# Patient Record
Sex: FEMALE | Race: WHITE | Employment: OTHER | URBAN - METROPOLITAN AREA
[De-identification: names, ages, dates, MRNs, and addresses within clinical notes are randomized per-mention and may not be internally consistent; named-entity substitution may affect disease eponyms.]

---

## 2019-06-18 ENCOUNTER — NURSING HOME VISIT (OUTPATIENT)
Dept: GERIATRICS | Facility: OTHER | Age: 84
End: 2019-06-18
Payer: MEDICARE

## 2019-06-18 DIAGNOSIS — F02.80 LATE ONSET ALZHEIMER'S DISEASE WITHOUT BEHAVIORAL DISTURBANCE (HCC): ICD-10-CM

## 2019-06-18 DIAGNOSIS — R53.81 DEBILITY: Primary | ICD-10-CM

## 2019-06-18 DIAGNOSIS — R63.4 WEIGHT LOSS: ICD-10-CM

## 2019-06-18 DIAGNOSIS — G30.1 LATE ONSET ALZHEIMER'S DISEASE WITHOUT BEHAVIORAL DISTURBANCE (HCC): ICD-10-CM

## 2019-06-18 PROCEDURE — 99307 SBSQ NF CARE SF MDM 10: CPT | Performed by: FAMILY MEDICINE

## 2019-08-19 ENCOUNTER — NURSING HOME VISIT (OUTPATIENT)
Dept: GERIATRICS | Facility: OTHER | Age: 84
End: 2019-08-19
Payer: MEDICARE

## 2019-08-19 DIAGNOSIS — R53.81 DEBILITY: ICD-10-CM

## 2019-08-19 DIAGNOSIS — G30.1 LATE ONSET ALZHEIMER'S DISEASE WITHOUT BEHAVIORAL DISTURBANCE (HCC): Primary | ICD-10-CM

## 2019-08-19 DIAGNOSIS — K05.6 PERIODONTAL DISEASE: ICD-10-CM

## 2019-08-19 DIAGNOSIS — E03.9 HYPOTHYROIDISM, UNSPECIFIED TYPE: ICD-10-CM

## 2019-08-19 DIAGNOSIS — F02.80 LATE ONSET ALZHEIMER'S DISEASE WITHOUT BEHAVIORAL DISTURBANCE (HCC): Primary | ICD-10-CM

## 2019-08-19 DIAGNOSIS — I82.509 CHRONIC DEEP VEIN THROMBOSIS (DVT) (HCC): ICD-10-CM

## 2019-08-19 PROCEDURE — 99309 SBSQ NF CARE MODERATE MDM 30: CPT | Performed by: FAMILY MEDICINE

## 2019-08-19 NOTE — ASSESSMENT & PLAN NOTE
Advanced, with progressive, expected decline  Continue supportive care  Continue LTCF for 24/7 and ADL care  Patient remains appropriate for hospice care

## 2019-08-19 NOTE — PROGRESS NOTES
[facility]   [address] [facility], [place], [address]  Progress Note  POS: Nursing Facility/LTC- 32  Hospice patient    Unable to obtain from patient due to:  unable to obtain from patient: Dementia  Chief Complaint/Reason for visit: Follow up of chronic medical conditions   History of Present Illness: 80year old female seen for follow up of chronic medical conditions  She continues on hospice care  Has dementia, minimal verbal responses to questioning, word salad response to open conversation; answers "no" when questioned directly about if she has any pain  Her weight is down 2lbs since June  Last bowel movement on 8/17 per nursing note review  Past Medical History: unchanged from history and physical  Past Medical History:   Diagnosis Date    Alzheimer's dementia     Anemia     Aphasia     Chronic diastolic CHF (congestive heart failure) ([facility])     Depression     DVT (deep venous thrombosis) (HCC)     Hyperlipidemia     Hypothyroidism     OA (osteoarthritis)     Osteoporosis      Family History: unchanged from history and physical  Social History: unchanged from history and physical  Resident Since: August 2013  Review of systems: Review of Systems   Unable to perform ROS: Dementia     Medications: No changes made  Allergies: NKDA  Consults reviewed:N/A  Labs/Diagnostics (reviewed by this provider): N/A    Physical Exam    Weight: 159 2lb Temp:97 5F BP:125/76 Pulse:82 Resp:18 O2 Sat:N/A  Constitutional: Well-nourished, Normocephalic and Pallor  Orientation:Person- opens eyes to name     Physical Exam   Constitutional: No distress  HENT:   Head: Normocephalic and atraumatic  Mouth/Throat: Oropharynx is clear and moist    Eyes: Conjunctivae are normal  Right eye exhibits no discharge  Left eye exhibits no discharge  No scleral icterus  Cardiovascular: Normal rate and regular rhythm  Pulmonary/Chest: Effort normal  No respiratory distress  Abdominal: Soft  She exhibits distension   There is no tenderness  There is no guarding  Hyperactive bowel sounds   Neurological: She is alert  Minimal verbal responses; word salad responses to open conversation and questioning  Moves all 4 extremities    Skin: She is not diaphoretic  There is pallor  Psychiatric: Cognition and memory are impaired  Flat affect   Nursing note and vitals reviewed      Assessment/Plan:  80year old female with:    Late onset Alzheimer's disease without behavioral disturbance  Advanced, with progressive, expected decline  Continue supportive care  Continue LTCF for 24/7 and ADL care  Patient remains appropriate for hospice care    Hypothyroidism  Continue levothyroxine 50mcg daily    Chronic deep vein thrombosis (DVT) ([facility] [address] )  Continues on xarelto     Periodontal disease  Continue oral care; continue chlorhexidine, peridex  Completed course of Keflex in March    Debility  Multifactorial  Continue LTCF for 24/7 and ADL care  Supportive care      Rene rodney DO  8/19/201911:08 AM

## 2019-10-24 ENCOUNTER — NURSING HOME VISIT (OUTPATIENT)
Dept: GERIATRICS | Facility: OTHER | Age: 84
End: 2019-10-24
Payer: MEDICARE

## 2019-10-24 DIAGNOSIS — E03.9 HYPOTHYROIDISM, UNSPECIFIED TYPE: ICD-10-CM

## 2019-10-24 DIAGNOSIS — G30.1 LATE ONSET ALZHEIMER'S DISEASE WITHOUT BEHAVIORAL DISTURBANCE (HCC): Primary | ICD-10-CM

## 2019-10-24 DIAGNOSIS — F02.80 LATE ONSET ALZHEIMER'S DISEASE WITHOUT BEHAVIORAL DISTURBANCE (HCC): Primary | ICD-10-CM

## 2019-10-24 DIAGNOSIS — R62.7 FAILURE TO THRIVE IN ADULT: ICD-10-CM

## 2019-10-24 PROCEDURE — 99308 SBSQ NF CARE LOW MDM 20: CPT | Performed by: FAMILY MEDICINE

## 2019-10-24 NOTE — PROGRESS NOTES
[address], [address]  Progress Note  POS: Nursing Facility/LTC- 32  Hospice Patient    Unable to obtain from patient due to:  unable to obtain from patient: Dementia Additional history obtained from daughter at bedside  Chief Complaint/Reason for visit: Follow up of chronic medical conditions  History of Present Illness: 80year old female seen for follow up of chronic medical conditions  Daughter at bedside and provides additional history  She notes patient has improved appetite and PO intake since she was treated with antibiotics for dental pain/broken teeth; daughter feels patient is more comfortable and hasn't seemed in pain to her  Past Medical History: unchanged from history and physical  Past Medical History:   Diagnosis Date    Alzheimer's dementia     Anemia     Aphasia     Chronic diastolic CHF (congestive heart failure) (HCC)     Depression     DVT (deep venous thrombosis) (HCC)     Hyperlipidemia     Hypothyroidism     OA (osteoarthritis)     Osteoporosis      Family History: unchanged from history and physical  Social History: unchanged from history and physical  Resident Since: August 2013  Review of systems: Review of Systems   Unable to perform ROS: Dementia   Constitutional: Negative for appetite change, fatigue and unexpected weight change  Psychiatric/Behavioral: Negative for behavioral problems  Medications: No changes made  Allergies: NKDA  Consults reviewed: N/A  Labs/Diagnostics (reviewed by this provider): N/A    Physical Exam    Weight: 159 2lb Temp:98 3F BP:112/67 Pulse:88 Resp:16   Constitutional: Well-nourished and Normocephalic  Orientation:Person     Physical Exam   Constitutional: She appears well-developed and well-nourished  No distress  HENT:   Head: Normocephalic and atraumatic     Right Ear: External ear normal    Left Ear: External ear normal    Mouth/Throat: Oropharynx is clear and moist    Eyes: Conjunctivae are normal  Right eye exhibits no discharge  Left eye exhibits no discharge  No scleral icterus  Neck: Neck supple  Cardiovascular: Normal rate and regular rhythm  Pulmonary/Chest: Effort normal and breath sounds normal  No respiratory distress  Abdominal: Soft  Bowel sounds are normal  She exhibits no distension  Neurological: She is alert  Skin: Skin is warm and dry  She is not diaphoretic  Psychiatric: She is withdrawn  Cognition and memory are impaired  She is noncommunicative  Nursing note and vitals reviewed      Assessment/Plan:  80year old female with:    Late onset Alzheimer's disease without behavioral disturbance  Continue supportive care  Continue LTCF for 24/7 and ADL care    Failure to thrive in adult  In setting of above  Compounded by periodontal disease  Per daughter, some improvement in PO intake since treated for tooth disease  Plan to monitor weights at least monthly  Supportive care, nutritional support    Hypothyroidism  Continue levothyroxine 50mcg daily      Carolann Madera,   10/24/19

## 2019-10-27 PROBLEM — R62.7 FAILURE TO THRIVE IN ADULT: Status: ACTIVE | Noted: 2019-10-27

## 2019-10-27 NOTE — ASSESSMENT & PLAN NOTE
In setting of above  Compounded by periodontal disease  Per daughter, some improvement in PO intake since treated for tooth disease  Plan to monitor weights at least monthly  Supportive care, nutritional support

## 2019-12-19 ENCOUNTER — NURSING HOME VISIT (OUTPATIENT)
Dept: GERIATRICS | Facility: OTHER | Age: 84
End: 2019-12-19
Payer: MEDICARE

## 2019-12-19 DIAGNOSIS — F02.80 LATE ONSET ALZHEIMER'S DISEASE WITHOUT BEHAVIORAL DISTURBANCE (HCC): Primary | ICD-10-CM

## 2019-12-19 DIAGNOSIS — I82.501 CHRONIC DEEP VEIN THROMBOSIS (DVT) OF RIGHT LOWER EXTREMITY, UNSPECIFIED VEIN (HCC): ICD-10-CM

## 2019-12-19 DIAGNOSIS — E03.9 HYPOTHYROIDISM, UNSPECIFIED TYPE: ICD-10-CM

## 2019-12-19 DIAGNOSIS — R13.10 DYSPHAGIA, UNSPECIFIED TYPE: ICD-10-CM

## 2019-12-19 DIAGNOSIS — Z51.5 HOSPICE CARE PATIENT: ICD-10-CM

## 2019-12-19 DIAGNOSIS — G30.1 LATE ONSET ALZHEIMER'S DISEASE WITHOUT BEHAVIORAL DISTURBANCE (HCC): Primary | ICD-10-CM

## 2019-12-19 DIAGNOSIS — R53.81 DEBILITY: ICD-10-CM

## 2019-12-19 PROCEDURE — 99308 SBSQ NF CARE LOW MDM 20: CPT | Performed by: FAMILY MEDICINE

## 2019-12-22 PROBLEM — Z51.5 HOSPICE CARE PATIENT: Status: ACTIVE | Noted: 2019-12-22

## 2019-12-22 PROBLEM — R13.10 DYSPHAGIA: Status: ACTIVE | Noted: 2019-12-22

## 2020-02-10 ENCOUNTER — NURSING HOME VISIT (OUTPATIENT)
Dept: GERIATRICS | Facility: OTHER | Age: 85
End: 2020-02-10
Payer: MEDICARE

## 2020-02-10 DIAGNOSIS — I82.501 CHRONIC DEEP VEIN THROMBOSIS (DVT) OF RIGHT LOWER EXTREMITY, UNSPECIFIED VEIN (HCC): ICD-10-CM

## 2020-02-10 DIAGNOSIS — R53.81 DEBILITY: ICD-10-CM

## 2020-02-10 DIAGNOSIS — E03.9 HYPOTHYROIDISM, UNSPECIFIED TYPE: Primary | ICD-10-CM

## 2020-02-10 DIAGNOSIS — G30.1 LATE ONSET ALZHEIMER'S DISEASE WITHOUT BEHAVIORAL DISTURBANCE (HCC): ICD-10-CM

## 2020-02-10 DIAGNOSIS — F02.80 LATE ONSET ALZHEIMER'S DISEASE WITHOUT BEHAVIORAL DISTURBANCE (HCC): ICD-10-CM

## 2020-02-10 PROCEDURE — 99309 SBSQ NF CARE MODERATE MDM 30: CPT | Performed by: FAMILY MEDICINE

## 2020-02-10 NOTE — PROGRESS NOTES
[facility]   [address] [facility], [place], [address]  Progress Note  POS: Nursing Facility/LTC- 28    Unable to obtain from patient due to:  unable to obtain from patient: Dementia  Chief Complaint/Reason for visit: Follow up dementia, hypothyroidism, chronic DVT  History of Present Illness: 80year old female seen for routine follow up of chronic medical conditions  Patient with improved appetite and PO intake with pureed diets and feeding assistance; weight 157 2lb January 2019, 165 4lb 1/29/20; patient discharged from hospice care in this setting  Continued requirement of LTC/ADL care due to advanced dementia  Due for TSH and metabolic panel for hypothyroidism, continues on levothyroxine       Past Medical History: unchanged from history and physical  Past Medical History:   Diagnosis Date    Alzheimer's dementia ([facility] [address] )     Anemia     Aphasia     Chronic diastolic CHF (congestive heart failure) ([facility])     Depression     DVT (deep venous thrombosis) ([facility])     Hyperlipidemia     Hypothyroidism     OA (osteoarthritis)     Osteoporosis      Family History: unchanged from history and physical  Social History: unchanged from history and physical  Resident Since: August 2013  Review of systems: Review of Systems   Unable to perform ROS: Dementia     Medications: No changes made and Pharmacy query addressed   Cyclobenzaprine 5mg BID  Levothyroxine 50mcg daily  Chlorhexidine mouth wash daily  Senna-S 8 6mh-50mg, 2 tablets daily  Xarelto 15 mg daily    PRN: dulcolax, fleet enema, MoM, maalox, acetaminophen, adult tussin, miralax, anbesol    Puree dysphagia 1 diet    Allergies: NKDA  Consults reviewed:N/A  Labs/Diagnostics (reviewed by this provider): Copy in Chart- March 2019    Physical Exam    Weight: 165 4lb Temp:97 6F BP:122/65  Pulse:18 Resp:83  Constitutional: Well-nourished and Normocephalic  Orientation:Person- opens eyes to name     Physical Exam   Constitutional: She appears well-developed and well-nourished  No distress  HENT:   Head: Normocephalic and atraumatic  Mouth/Throat: Oropharynx is clear and moist    Missing/poor dentition   Eyes: Right eye exhibits no discharge  Left eye exhibits no discharge  Neck: No tracheal deviation present  Pulmonary/Chest: Effort normal  No respiratory distress  Musculoskeletal: She exhibits no edema or tenderness  Neurological: No cranial nerve deficit  Opens eyes briefly to name   Skin: Skin is warm and dry  She is not diaphoretic  There is pallor  Psychiatric: She is withdrawn  Cognition and memory are impaired  Nursing note and vitals reviewed      Assessment/Plan:  80year old female with:    Hypothyroidism  Continue levothyroxine 50mcg daily  TSH, free T4, BMP ordered for 3/5/20    Chronic deep vein thrombosis (DVT) (HCC)  Chronic RLE DVT  Continue xarelto  CBC w/diff, BMP ordered for 3/5/20    Late onset Alzheimer's disease without behavioral disturbance  Continue supportive care  Continue LTCF for 24/7 and ADL care  Management of chronic medical conditions as outlined     Debility  Multifactorial  Continue LTCF for 24/7 and ADL care  Supportive care      Felisa Anthony DO  2/10/20

## 2020-02-11 NOTE — ASSESSMENT & PLAN NOTE
Continue supportive care  Continue LTCF for 24/7 and ADL care  Management of chronic medical conditions as outlined

## 2020-03-23 ENCOUNTER — NURSING HOME VISIT (OUTPATIENT)
Dept: GERIATRICS | Facility: OTHER | Age: 85
End: 2020-03-23
Payer: MEDICARE

## 2020-03-23 DIAGNOSIS — R13.10 DYSPHAGIA, UNSPECIFIED TYPE: ICD-10-CM

## 2020-03-23 DIAGNOSIS — E03.9 HYPOTHYROIDISM, UNSPECIFIED TYPE: ICD-10-CM

## 2020-03-23 DIAGNOSIS — R53.81 DEBILITY: ICD-10-CM

## 2020-03-23 DIAGNOSIS — G30.1 LATE ONSET ALZHEIMER'S DISEASE WITHOUT BEHAVIORAL DISTURBANCE (HCC): Primary | ICD-10-CM

## 2020-03-23 DIAGNOSIS — F02.80 LATE ONSET ALZHEIMER'S DISEASE WITHOUT BEHAVIORAL DISTURBANCE (HCC): Primary | ICD-10-CM

## 2020-03-23 DIAGNOSIS — R63.4 WEIGHT LOSS: ICD-10-CM

## 2020-03-23 PROCEDURE — 99309 SBSQ NF CARE MODERATE MDM 30: CPT | Performed by: FAMILY MEDICINE

## 2020-03-23 NOTE — PROGRESS NOTES
[facility]   [address] [facility], [place], [address]  Progress Note  POS: Nursing Facility/LTC- 28    Unable to obtain from patient due to:  unable to obtain from patient: Dementia  Chief Complaint/Reason for visit:Follow up chronic medical conditions; weight loss, dementia, hypothyroidism  History of Present Illness: 80year old female seen for follow up of chronic medical conditions  Noted to have recent 10lb weight loss  Continues on levothyroxine for hypothyroidism- labs checked and WNL  Has poor dentition and advanced dementia with dysphagia, requires feeding assistance and total ADL care  No reports of pain or discomfort; prior authorization sent earlier this month for flexeril  Past Medical History: unchanged from history and physical  Past Medical History:   Diagnosis Date    Alzheimer's dementia ([facility] [address] )     Anemia     Aphasia     Chronic diastolic CHF (congestive heart failure) (HCC)     Depression     DVT (deep venous thrombosis) (HCC)     Hyperlipidemia     Hypothyroidism     OA (osteoarthritis)     Osteoporosis      Family History: unchanged from history and physical  Social History: unchanged from history and physical  Resident Since: August 2013  Review of systems: Review of Systems   Unable to perform ROS: Dementia   Constitutional: Positive for unexpected weight change  Negative for fever  Medications: No changes made  Allergies: NKDA  Consults reviewed:N/A  Labs/Diagnostics (reviewed by this provider): Copy in Chart (3/5/20)  TSH:3 11     Free T4:1 44  CBC: Hb:13 6 Hct:40 9 WBC:7 7 PLT:208  CMP: Na:140 K:4 0 Cl:106 CO:27 BUN:12 Cr:0 57 Glu:74 Ca:8 8   AST:22 ALT:39 Alk-P:63 Tprotein:6 5 Albumin:2 9   Tbili:0 5     Physical Exam    Weight: 155lb (165 4) Temp:98F BP:112/71  Pulse:80 Resp:20 O2 Sat:97% RA  Constitutional: Well-nourished and Normocephalic  Orientation:Not able to assess secondary to dementia/aphasia      Physical Exam   Constitutional: She appears lethargic  No distress  HENT:   Head: Normocephalic and atraumatic  Mouth/Throat: Oropharynx is clear and moist  No oropharyngeal exudate  Poor/missing dentition    Eyes: Right eye exhibits no discharge  Left eye exhibits no discharge  Neck: Neck supple  No tracheal deviation present  Cardiovascular: Normal rate and regular rhythm  Pulmonary/Chest: Effort normal  No respiratory distress  Abdominal: Soft  Bowel sounds are normal    Musculoskeletal: She exhibits no edema or tenderness  Neurological: She appears lethargic  Opens eyes briefly to voice then goes back to sleep    Skin: Skin is warm and dry  She is not diaphoretic  Psychiatric: She is withdrawn  Cognition and memory are impaired  She is noncommunicative  Nursing note and vitals reviewed      Assessment/Plan:  80year old female with:    Late onset Alzheimer's disease without behavioral disturbance  Continue supportive care  Continue LTCF for 24/7 and ADL care  Management of chronic medical conditions as outlined   With continued progression with associated weight loss    Weight loss  In setting of above  Continue supportive care, nutritional support  Dietary/nutrition consult and supplementation, patient requires total assist for feed    Hypothyroidism  Continue levothyroxine 50mcg daily  TSH, free T4 recently checked and WNL    Dysphagia  Continue puree diet  Aspiration precautions  Monitor weights monthly    Debility  Multifactorial  Continue LTCF for 24/7 and ADL care  Supportive care      [facility],   3/23/20

## 2020-03-23 NOTE — ASSESSMENT & PLAN NOTE
In setting of above  Continue supportive care, nutritional support  Dietary/nutrition consult and supplementation, patient requires total assist for feed

## 2020-03-23 NOTE — ASSESSMENT & PLAN NOTE
Continue supportive care  Continue LTCF for 24/7 and ADL care  Management of chronic medical conditions as outlined   With continued progression with associated weight loss

## 2020-05-08 ENCOUNTER — NURSING HOME VISIT (OUTPATIENT)
Dept: GERIATRICS | Facility: OTHER | Age: 85
End: 2020-05-08
Payer: MEDICARE

## 2020-05-08 DIAGNOSIS — M62.838 MUSCLE SPASM: ICD-10-CM

## 2020-05-08 DIAGNOSIS — G30.1 LATE ONSET ALZHEIMER'S DISEASE WITHOUT BEHAVIORAL DISTURBANCE (HCC): Primary | ICD-10-CM

## 2020-05-08 DIAGNOSIS — F02.80 LATE ONSET ALZHEIMER'S DISEASE WITHOUT BEHAVIORAL DISTURBANCE (HCC): Primary | ICD-10-CM

## 2020-05-08 DIAGNOSIS — E03.9 HYPOTHYROIDISM, UNSPECIFIED TYPE: ICD-10-CM

## 2020-05-08 DIAGNOSIS — I82.501 CHRONIC DEEP VEIN THROMBOSIS (DVT) OF RIGHT LOWER EXTREMITY, UNSPECIFIED VEIN (HCC): ICD-10-CM

## 2020-05-08 DIAGNOSIS — R53.81 DEBILITY: ICD-10-CM

## 2020-05-08 PROCEDURE — 99309 SBSQ NF CARE MODERATE MDM 30: CPT | Performed by: FAMILY MEDICINE

## 2020-05-09 PROBLEM — Z51.5 HOSPICE CARE PATIENT: Status: RESOLVED | Noted: 2019-12-22 | Resolved: 2020-05-09

## 2020-05-09 PROBLEM — M62.838 MUSCLE SPASM: Status: ACTIVE | Noted: 2020-05-09

## 2020-06-01 LAB — EXT SARS-COV-2: NOT DETECTED

## 2020-07-09 ENCOUNTER — NURSING HOME VISIT (OUTPATIENT)
Dept: GERIATRICS | Facility: OTHER | Age: 85
End: 2020-07-09
Payer: MEDICARE

## 2020-07-09 DIAGNOSIS — I82.501 CHRONIC DEEP VEIN THROMBOSIS (DVT) OF RIGHT LOWER EXTREMITY, UNSPECIFIED VEIN (HCC): Primary | ICD-10-CM

## 2020-07-09 DIAGNOSIS — R53.81 DEBILITY: ICD-10-CM

## 2020-07-09 DIAGNOSIS — G30.1 LATE ONSET ALZHEIMER'S DISEASE WITHOUT BEHAVIORAL DISTURBANCE (HCC): ICD-10-CM

## 2020-07-09 DIAGNOSIS — R13.10 DYSPHAGIA, UNSPECIFIED TYPE: ICD-10-CM

## 2020-07-09 DIAGNOSIS — E03.9 HYPOTHYROIDISM, UNSPECIFIED TYPE: ICD-10-CM

## 2020-07-09 DIAGNOSIS — M62.838 MUSCLE SPASM: ICD-10-CM

## 2020-07-09 DIAGNOSIS — F02.80 LATE ONSET ALZHEIMER'S DISEASE WITHOUT BEHAVIORAL DISTURBANCE (HCC): ICD-10-CM

## 2020-07-09 PROCEDURE — 99308 SBSQ NF CARE LOW MDM 20: CPT | Performed by: FAMILY MEDICINE

## 2020-07-09 NOTE — PROGRESS NOTES
[address], [facility], [address]  Progress Note  POS: Nursing Facility/LTC-32    Unable to obtain from patient due to:  unable to obtain from patient: Dementia  Chief Complaint/Reason for visit:  History of Present Illness: 80year old female evaluated for routine follow up of chronic medical conditions  Continues on levothyroxine for hypothyroidism; weight stable overall  Tolerating baclofen for upper extremity mild contractures  Continues on xarelto for chronic DVT  Past Medical History: unchanged from history and physical  Past Medical History:   Diagnosis Date    Alzheimer's dementia ([address] )     Anemia     Aphasia     [facility]cong[facility]) ([facility])     Depression     DVT (deep venous thrombosis) (HCC)     Hyperlipidemia     Hypothyroidism     OA (osteoarthritis)     Osteoporosis      Family History: unchanged from history and physical  Social History: unchanged from history and physical  Resident Since: August 2013  Review of systems: Review of Systems   Unable to perform ROS: Dementia     Medications: No changes made   Baclofen 10mg BID  Levothyroxine 50mcg daily  Senna-S 8 6mg-50mg daily  Xarelto 15mg daily    PRN: dulcolax, fleet enema, maalox, MoM, miralax, anbesol, acetaminophen    Allergies: NKDA  Consults reviewed: N/A  Labs/Diagnostics (reviewed by this provider): Copy in Chart   Other: COVID-19 (7/1/20): negative    Physical Exam    Weight: 156 3lb (157 6) Temp:97 2F BP:132/54  Pulse:96 Resp:18 O2 Sat:N/A  Constitutional: Well-nourished and Normocephalic  Orientation:Person     Physical Exam   Constitutional: She appears well-developed and well-nourished  No distress  HENT:   Head: Normocephalic and atraumatic  Mouth/Throat: Oropharynx is clear and moist    Eyes: Conjunctivae are normal  Right eye exhibits no discharge  Left eye exhibits no discharge  No scleral icterus  Neck: Neck supple     Decreased ROM overall   Cardiovascular: Normal rate and regular rhythm  Pulmonary/Chest: Effort normal  No respiratory distress  She has no wheezes  Abdominal: Soft  She exhibits no distension  Musculoskeletal: She exhibits deformity (mild flexion contractures b/l hands)  She exhibits no edema  Neurological: She is alert  She exhibits abnormal muscle tone (increased tone b/l UEs)  Skin: Skin is warm and dry  She is not diaphoretic  Psychiatric: She is slowed  Cognition and memory are impaired  She is noncommunicative (minimal verbal sounds)  She is inattentive  Nursing note and vitals reviewed      Assessment/Plan:  80year old female with:    Chronic deep vein thrombosis (DVT) (HCC)  Chronic RLE DVT  Continue xarelto    Hypothyroidism  Continue levothyroxine    Dysphagia  Continue puree diet  Aspiration precautions  Monitor weights monthly- stable overall    Late onset Alzheimer's disease without behavioral disturbance  Continue supportive care  Continue LTCF for 24/7 and ADL care  Management of chronic medical conditions as outlined     Muscle spasm  In setting of advanced dementia; with mild flexion contractures of b/l wrists/hands  Continue baclofen, repositioning; monitor skin closely    Debility  Multifactorial  Continue LTCF for 24/7 and ADL care  Supportive care    CBC, CMP, TFTs WNL March 2020- plan to recheck March 2021 or change in condition    Belinda Madera DO  7/9/20

## 2020-07-14 PROBLEM — R63.4 WEIGHT LOSS: Status: RESOLVED | Noted: 2019-06-18 | Resolved: 2020-07-14

## 2020-07-14 PROBLEM — R62.7 FAILURE TO THRIVE IN ADULT: Status: RESOLVED | Noted: 2019-10-27 | Resolved: 2020-07-14

## 2020-07-14 NOTE — ASSESSMENT & PLAN NOTE
In setting of advanced dementia; with mild flexion contractures of b/l wrists/hands  Continue baclofen, repositioning; monitor skin closely

## 2020-09-04 ENCOUNTER — NURSING HOME VISIT (OUTPATIENT)
Dept: GERIATRICS | Facility: OTHER | Age: 85
End: 2020-09-04
Payer: MEDICARE

## 2020-09-04 DIAGNOSIS — M62.838 MUSCLE SPASM: ICD-10-CM

## 2020-09-04 DIAGNOSIS — F02.80 LATE ONSET ALZHEIMER'S DISEASE WITHOUT BEHAVIORAL DISTURBANCE (HCC): Primary | ICD-10-CM

## 2020-09-04 DIAGNOSIS — E03.9 HYPOTHYROIDISM, UNSPECIFIED TYPE: ICD-10-CM

## 2020-09-04 DIAGNOSIS — I82.501 CHRONIC DEEP VEIN THROMBOSIS (DVT) OF RIGHT LOWER EXTREMITY, UNSPECIFIED VEIN (HCC): ICD-10-CM

## 2020-09-04 DIAGNOSIS — R13.10 DYSPHAGIA, UNSPECIFIED TYPE: ICD-10-CM

## 2020-09-04 DIAGNOSIS — G30.1 LATE ONSET ALZHEIMER'S DISEASE WITHOUT BEHAVIORAL DISTURBANCE (HCC): Primary | ICD-10-CM

## 2020-09-04 DIAGNOSIS — R53.81 DEBILITY: ICD-10-CM

## 2020-09-04 PROCEDURE — 99308 SBSQ NF CARE LOW MDM 20: CPT | Performed by: FAMILY MEDICINE

## 2020-09-04 RX ORDER — BISACODYL 10 MG
10 SUPPOSITORY, RECTAL RECTAL
COMMUNITY

## 2020-09-04 RX ORDER — ACETAMINOPHEN 325 MG/1
650 TABLET ORAL EVERY 6 HOURS PRN
COMMUNITY

## 2020-09-04 RX ORDER — LEVOTHYROXINE SODIUM 0.05 MG/1
50 TABLET ORAL DAILY
COMMUNITY

## 2020-09-04 RX ORDER — AMOXICILLIN 250 MG
2 CAPSULE ORAL DAILY
COMMUNITY

## 2020-09-04 RX ORDER — BACLOFEN 10 MG/1
10 TABLET ORAL 2 TIMES DAILY
COMMUNITY

## 2020-09-04 NOTE — PROGRESS NOTES
[facility]   [address] [place] [address], [place], [address]  Progress Note  POS: Nursing Facility/LTC-32    Unable to obtain from patient due to:  unable to obtain from patient: Dementia  Chief Complaint/Reason for visit: Follow up chronic medical conditions- dementia, dysphagia, hypothyroidism, chronic DVT  History of Present Illness: 80year old female evaluated for follow up of chronic medical conditions  Weight has been stable within 1lb over past 2 months; continues on dysphagia diet with feeding assistance  Continues on baclofen for spasticity; no reports of pain or discomfort  Continues on levothyroxine for hypothyroidism  Past Medical History: unchanged from history and physical  Past Medical History:   Diagnosis Date    [facility]'s dementia ([facility] [address] )     Anemia     Aphasia     Chronic diastolic CHF (congestive heart failure) (HCC)     Depression     DVT (deep venous thrombosis) (HCC)     Hyperlipidemia     Hypothyroidism     OA (osteoarthritis)     Osteoporosis      Family History: unchanged from history and physical  Social History: unchanged from history and physical  Resident Since: August 2013  Review of systems: Review of Systems   Unable to perform ROS: Dementia   Constitutional: Negative for fever and unexpected weight change  Medications: No changes made Medication list updated in Epic on 9/4/20 to reflect most recent SNF orders  Allergies: NKDA  Consults reviewed: N/A  Labs/Diagnostics (reviewed by this provider): Copy in Chart CBC, CMP, TSH, free T4 from 3/5/20 all WNL    Physical Exam    Weight: 156 8lb (155 5) Temp:97 2F BP:113/67  Pulse:77 Resp:18  Constitutional: Well-nourished and Normocephalic  Orientation:Unable to assess- no verbal response    Physical Exam  Vitals signs and nursing note reviewed  Constitutional:       General: She is not in acute distress  Appearance: She is not ill-appearing, toxic-appearing or diaphoretic  HENT:      Head: Normocephalic and atraumatic  Right Ear: External ear normal       Left Ear: External ear normal       Mouth/Throat:      Mouth: Mucous membranes are moist    Eyes:      General:         Right eye: No discharge  Left eye: No discharge  Neck:      Musculoskeletal: Neck supple  No neck rigidity  Cardiovascular:      Rate and Rhythm: Normal rate and regular rhythm  Pulmonary:      Effort: Pulmonary effort is normal  No respiratory distress  Breath sounds: Normal breath sounds  Abdominal:      General: Bowel sounds are normal  There is no distension  Palpations: Abdomen is soft  Musculoskeletal:         General: No tenderness  Right lower leg: No edema  Left lower leg: No edema  Skin:     General: Skin is warm and dry  Coloration: Skin is pale  Neurological:      Mental Status: Mental status is at baseline  Motor: Abnormal muscle tone (L>R UEs) present  No tremor  Psychiatric:         Attention and Perception: She is inattentive  Speech: She is noncommunicative  Behavior: Behavior is withdrawn  Cognition and Memory: Cognition is impaired  Memory is impaired         Assessment/Plan:  80year old female with:    Late onset Alzheimer's disease without behavioral disturbance  Continue supportive care  Continue LTCF for 24/7 and ADL care  Management of chronic medical conditions as outlined     Muscle spasm  In setting of advanced dementia  Continue baclofen, repositioning    Hypothyroidism  Continue levothyroxine    Dysphagia  Continue puree diet  Aspiration precautions  Monitor weights monthly- stable overall    Chronic deep vein thrombosis (DVT) ([facility] [address] )  Chronic RLE DVT  Continue xarelto    Debility  Multifactorial  Continue LTCF for 24/7 and ADL care  Supportive care      [address] [facility],   9/4/20

## 2020-11-09 ENCOUNTER — NURSING HOME VISIT (OUTPATIENT)
Dept: GERIATRICS | Facility: OTHER | Age: 85
End: 2020-11-09
Payer: MEDICARE

## 2020-11-09 DIAGNOSIS — R53.81 DEBILITY: ICD-10-CM

## 2020-11-09 DIAGNOSIS — R13.10 DYSPHAGIA, UNSPECIFIED TYPE: ICD-10-CM

## 2020-11-09 DIAGNOSIS — E03.9 HYPOTHYROIDISM, UNSPECIFIED TYPE: ICD-10-CM

## 2020-11-09 DIAGNOSIS — F02.80 LATE ONSET ALZHEIMER'S DISEASE WITHOUT BEHAVIORAL DISTURBANCE (HCC): Primary | ICD-10-CM

## 2020-11-09 DIAGNOSIS — M62.838 MUSCLE SPASM: ICD-10-CM

## 2020-11-09 DIAGNOSIS — G30.1 LATE ONSET ALZHEIMER'S DISEASE WITHOUT BEHAVIORAL DISTURBANCE (HCC): Primary | ICD-10-CM

## 2020-11-09 DIAGNOSIS — I82.501 CHRONIC DEEP VEIN THROMBOSIS (DVT) OF RIGHT LOWER EXTREMITY, UNSPECIFIED VEIN (HCC): ICD-10-CM

## 2020-11-09 PROCEDURE — 99309 SBSQ NF CARE MODERATE MDM 30: CPT | Performed by: FAMILY MEDICINE

## 2020-11-09 RX ORDER — POLYETHYLENE GLYCOL 3350 17 G/17G
17 POWDER, FOR SOLUTION ORAL DAILY PRN
COMMUNITY

## 2020-12-08 ENCOUNTER — NURSING HOME VISIT (OUTPATIENT)
Dept: GERIATRICS | Facility: OTHER | Age: 85
End: 2020-12-08
Payer: MEDICARE

## 2020-12-08 DIAGNOSIS — R13.10 DYSPHAGIA, UNSPECIFIED TYPE: ICD-10-CM

## 2020-12-08 DIAGNOSIS — M62.838 MUSCLE SPASM: ICD-10-CM

## 2020-12-08 DIAGNOSIS — E03.9 HYPOTHYROIDISM, UNSPECIFIED TYPE: ICD-10-CM

## 2020-12-08 DIAGNOSIS — G30.1 LATE ONSET ALZHEIMER'S DISEASE WITHOUT BEHAVIORAL DISTURBANCE (HCC): Primary | ICD-10-CM

## 2020-12-08 DIAGNOSIS — F02.80 LATE ONSET ALZHEIMER'S DISEASE WITHOUT BEHAVIORAL DISTURBANCE (HCC): Primary | ICD-10-CM

## 2020-12-08 DIAGNOSIS — R53.81 DEBILITY: ICD-10-CM

## 2020-12-08 PROCEDURE — 99309 SBSQ NF CARE MODERATE MDM 30: CPT | Performed by: FAMILY MEDICINE

## 2020-12-10 RX ORDER — ALUMINUM HYDROXIDE, MAGNESIUM HYDROXIDE, SIMETHICONE 400; 400; 40 MG/10ML; MG/10ML; MG/10ML
30 SUSPENSION ORAL EVERY 6 HOURS PRN
COMMUNITY

## 2021-01-06 ENCOUNTER — NURSING HOME VISIT (OUTPATIENT)
Dept: GERIATRICS | Facility: OTHER | Age: 86
End: 2021-01-06
Payer: MEDICARE

## 2021-01-06 DIAGNOSIS — R13.10 DYSPHAGIA, UNSPECIFIED TYPE: ICD-10-CM

## 2021-01-06 DIAGNOSIS — F02.80 LATE ONSET ALZHEIMER'S DISEASE WITHOUT BEHAVIORAL DISTURBANCE (HCC): Primary | ICD-10-CM

## 2021-01-06 DIAGNOSIS — G30.1 LATE ONSET ALZHEIMER'S DISEASE WITHOUT BEHAVIORAL DISTURBANCE (HCC): Primary | ICD-10-CM

## 2021-01-06 DIAGNOSIS — E03.9 HYPOTHYROIDISM, UNSPECIFIED TYPE: ICD-10-CM

## 2021-01-06 DIAGNOSIS — R53.81 DEBILITY: ICD-10-CM

## 2021-01-06 PROCEDURE — 99308 SBSQ NF CARE LOW MDM 20: CPT | Performed by: FAMILY MEDICINE

## 2021-01-06 NOTE — PROGRESS NOTES
[facility]   [address] [facility], [place], [address]  Progress Note  POS: Nursing Facility/LTC-32    Unable to obtain from patient due to:  unable to obtain from patient: Dementia Additional history obtained from nursing/nursing note review  Chief Complaint/Reason for visit: Follow up of chronic medical conditions   History of Present Illness: 80year old female evaluated for routine follow up of chronic medical conditions  Weight has been stable  Has been more interactive and talkative at times per nursing  Past Medical History: unchanged from history and physical  Past Medical History:   Diagnosis Date    Alzheimer's dementia ([facility] [address] )     Anemia     Aphasia     Chronic diastolic CHF [facility]congestive [facility] failure) ([facility])     Depression     DVT (deep venous thrombosis) ([facility])     Hyperlipidemia     Hypothyroidism     OA (osteoarthritis)     Osteoporosis      Family History: unchanged from history and physical  Social History: unchanged from history and physical  Resident Since: 8/7/13  Review of systems: Review of Systems   Unable to perform ROS: Dementia   Constitutional: Negative for fever and unexpected weight change  Medications: No changes made Medication list reviewed and updated in Epic to reflect most current SNF orders  Allergies: NKDA  Consults reviewed: N/A  Labs/Diagnostics (reviewed by this provider): N/A    Physical Exam    Weight: 158 2lb Temp:97 2F BP:137/79 Pulse:66 Resp:18  Constitutional: Well-nourished and Normocephalic  Orientation:Person     Physical Exam  Vitals signs and nursing note reviewed  Constitutional:       General: She is awake  She is not in acute distress  Appearance: Normal appearance  She is well-developed and well-groomed  She is not ill-appearing, toxic-appearing or diaphoretic  HENT:      Head: Normocephalic and atraumatic  Right Ear: External ear normal       Left Ear: External ear normal       Nose: No rhinorrhea        Mouth/Throat:      Mouth: Mucous membranes are moist       Pharynx: Oropharynx is clear  Eyes:      General: No scleral icterus  Right eye: No discharge  Left eye: No discharge  Conjunctiva/sclera: Conjunctivae normal    Neck:      Musculoskeletal: Neck supple  No neck rigidity  Cardiovascular:      Rate and Rhythm: Normal rate and regular rhythm  Heart sounds: S1 normal and S2 normal    Pulmonary:      Effort: Pulmonary effort is normal  No respiratory distress  Abdominal:      General: Bowel sounds are normal  There is distension  Tenderness: There is no abdominal tenderness  There is no guarding or rebound  Musculoskeletal:         General: No tenderness  Right lower leg: No edema  Left lower leg: No edema  Skin:     General: Skin is warm and dry  Capillary Refill: Capillary refill takes less than 2 seconds  Coloration: Skin is not jaundiced or pale  Neurological:      General: No focal deficit present  Mental Status: She is alert  Mental status is at baseline  Cranial Nerves: No cranial nerve deficit  Psychiatric:         Attention and Perception: Attention normal          Mood and Affect: Mood normal  Affect is flat  Speech: Speech is delayed  Behavior: Behavior is cooperative  Cognition and Memory: Cognition is impaired  Memory is impaired         Assessment/Plan:  80year old female with:    Late onset Alzheimer's disease without behavioral disturbance  Continue supportive care  Continue LTCF for 24/7 and ADL care including assist for feeding  Management of chronic medical conditions as outlined     Hypothyroidism  Continue levothyroxine    Dysphagia  Continue modified diet  Aspiration precautions  Continue to monitor weights monthly- remain stable    Debility  Multifactorial  Continue LTCF for 24/7 and ADL care  Supportive care    [address] [facility],   1/6/21

## 2021-01-07 NOTE — ASSESSMENT & PLAN NOTE
Continue supportive care  Continue LTCF for 24/7 and ADL care including assist for feeding  Management of chronic medical conditions as outlined

## 2021-02-11 ENCOUNTER — NURSING HOME VISIT (OUTPATIENT)
Dept: GERIATRICS | Facility: OTHER | Age: 86
End: 2021-02-11
Payer: MEDICARE

## 2021-02-11 DIAGNOSIS — R32 BOWEL AND BLADDER INCONTINENCE: ICD-10-CM

## 2021-02-11 DIAGNOSIS — G30.1 LATE ONSET ALZHEIMER'S DISEASE WITHOUT BEHAVIORAL DISTURBANCE (HCC): Primary | ICD-10-CM

## 2021-02-11 DIAGNOSIS — R53.81 DEBILITY: ICD-10-CM

## 2021-02-11 DIAGNOSIS — R15.9 BOWEL AND BLADDER INCONTINENCE: ICD-10-CM

## 2021-02-11 DIAGNOSIS — F02.80 LATE ONSET ALZHEIMER'S DISEASE WITHOUT BEHAVIORAL DISTURBANCE (HCC): Primary | ICD-10-CM

## 2021-02-11 DIAGNOSIS — R13.10 DYSPHAGIA, UNSPECIFIED TYPE: ICD-10-CM

## 2021-02-11 PROCEDURE — 99308 SBSQ NF CARE LOW MDM 20: CPT | Performed by: FAMILY MEDICINE

## 2021-02-11 NOTE — PROGRESS NOTES
[facility]   [address] [facility], [facility] [address]  Progress Note  POS: Nursing Facillity/LTC-32    Unable to obtain from patient due to: Dementia  Additional history obtained speaking with nursing/nursing note review  Chief Complaint/Reason for visit: Follow up of chronic medical conditions   History of Present Illness: 80year old female evaluated for routine follow up of chronic medical conditions  No PRN medication use  No concerns reported per nursing  Past Medical History: unchanged from history and physical  Past Medical History:   Diagnosis Date    Alzheimer's dementia ([address] )     Anemia     Aphasia     Chronic [facility] (congestive heart failure) ([facility])     Depression     DVT (deep venous thrombosis) ([facility])     Hyperlipidemia     Hypothyroidism     OA (osteoarthritis)     Osteoporosis      Family History: unchanged from history and physical  Social History: unchanged from history and physical  Resident Since: 8/7/13  Review of systems: Review of Systems   Unable to perform ROS: Dementia     Medications: No changes made- Medication list reviewed and updated in Epic to reflect most current SNF orders  Allergies: NKDA  Consults reviewed: N/A  Labs/Diagnostics (reviewed by this provider): N/A    Physical Exam    Weight: 158 2lb Temp:97 8F BP:135/70  Pulse:71 Resp:18  Constitutional: Well-nourished and Normocephalic  Orientation:Person     Physical Exam  Vitals signs and nursing note reviewed  Constitutional:       General: She is awake  She is not in acute distress  Appearance: Normal appearance  She is well-developed and well-groomed  She is not ill-appearing, toxic-appearing or diaphoretic  HENT:      Head: Normocephalic and atraumatic  Right Ear: External ear normal       Left Ear: External ear normal       Nose: No rhinorrhea  Mouth/Throat:      Mouth: Mucous membranes are moist       Pharynx: Oropharynx is clear  Eyes:      General: No scleral icterus          Right eye: No discharge  Left eye: No discharge  Conjunctiva/sclera: Conjunctivae normal    Neck:      Musculoskeletal: Neck supple  No neck rigidity  Cardiovascular:      Rate and Rhythm: Normal rate and regular rhythm  Heart sounds: S1 normal and S2 normal    Pulmonary:      Effort: Pulmonary effort is normal  No respiratory distress  Breath sounds: No wheezing  Abdominal:      General: There is no distension  Palpations: Abdomen is soft  Musculoskeletal:         General: No tenderness  Right lower leg: No edema  Left lower leg: No edema  Skin:     General: Skin is warm and dry  Capillary Refill: Capillary refill takes less than 2 seconds  Coloration: Skin is not jaundiced or pale  Neurological:      Mental Status: She is alert  Mental status is at baseline  Cranial Nerves: No cranial nerve deficit  Psychiatric:         Mood and Affect: Affect normal          Speech: Speech is delayed  Behavior: Behavior is cooperative  Cognition and Memory: Cognition is impaired  Memory is impaired         Assessment/Plan:  80year old female with:    Late onset Alzheimer's disease without behavioral disturbance  Continue supportive care  Continue LTCF for 24/7 and ADL care including assist for feeding  Management of chronic medical conditions as outlined     Dysphagia  Continue modified diet  Aspiration precautions  Continue to monitor weights monthly    Bowel and bladder incontinence  Toileting protocol    Debility  Multifactorial  Continue LTC for 24/7 and ADL care  Supportive care, nutritional support    Rachelle Howard DO  2/11/21

## 2021-02-14 PROBLEM — R32 BOWEL AND BLADDER INCONTINENCE: Status: ACTIVE | Noted: 2021-02-14

## 2021-02-14 PROBLEM — R15.9 BOWEL AND BLADDER INCONTINENCE: Status: ACTIVE | Noted: 2021-02-14

## 2021-03-11 ENCOUNTER — NURSING HOME VISIT (OUTPATIENT)
Dept: GERIATRICS | Facility: OTHER | Age: 86
End: 2021-03-11
Payer: MEDICARE

## 2021-03-11 DIAGNOSIS — F02.80 LATE ONSET ALZHEIMER'S DISEASE WITHOUT BEHAVIORAL DISTURBANCE (HCC): ICD-10-CM

## 2021-03-11 DIAGNOSIS — E03.9 HYPOTHYROIDISM, UNSPECIFIED TYPE: Primary | ICD-10-CM

## 2021-03-11 DIAGNOSIS — G30.1 LATE ONSET ALZHEIMER'S DISEASE WITHOUT BEHAVIORAL DISTURBANCE (HCC): ICD-10-CM

## 2021-03-11 DIAGNOSIS — I82.501 CHRONIC DEEP VEIN THROMBOSIS (DVT) OF RIGHT LOWER EXTREMITY, UNSPECIFIED VEIN (HCC): ICD-10-CM

## 2021-03-11 DIAGNOSIS — R13.10 DYSPHAGIA, UNSPECIFIED TYPE: ICD-10-CM

## 2021-03-11 PROCEDURE — 99309 SBSQ NF CARE MODERATE MDM 30: CPT | Performed by: FAMILY MEDICINE

## 2021-03-12 NOTE — PROGRESS NOTES
[facility]   [address] [facility], [facility], [address]  Progress Note  POS: Nursing Facility/LTC-32    Unable to obtain from patient due to:  Dementia  Chief Complaint/Reason for visit: Follow up of chronic medical conditions  History of Present Illness: 80year old female evaluated for routine follow up of chronic medical conditions  Continues on levothyroxine for hypothyroidism, xarelto for chronic DVT; due for yearly labs  Past Medical History: Reviewed and unchanged  Family History: unchanged from history and physical  Social History: unchanged from history and physical  Resident Since: 8/7/13  Review of systems: Review of Systems   Unable to perform ROS: Dementia   Constitutional: Negative for fever and unexpected weight change  Medications: No changes made- Medication list reviewed and updated in Epic to reflect most current SNF orders  Allergies: NKDA    Physical Exam    Weight: 160 6lb Temp:97 5F BP:117/60 Pulse:60 Resp:18  Constitutional: Well-nourished and Normocephalic  Orientation:Person     Physical Exam  Vitals signs and nursing note reviewed  Constitutional:       General: She is not in acute distress  Appearance: She is well-developed and well-groomed  She is not ill-appearing, toxic-appearing or diaphoretic  HENT:      Head: Normocephalic and atraumatic  Right Ear: External ear normal       Left Ear: External ear normal       Nose: No rhinorrhea  Mouth/Throat:      Mouth: Mucous membranes are moist    Eyes:      General: No scleral icterus  Right eye: No discharge  Left eye: No discharge  Conjunctiva/sclera: Conjunctivae normal    Neck:      Musculoskeletal: Neck supple  No neck rigidity  Cardiovascular:      Rate and Rhythm: Normal rate and regular rhythm  Heart sounds: S1 normal and S2 normal    Pulmonary:      Effort: Pulmonary effort is normal  No respiratory distress  Breath sounds: No wheezing     Abdominal:      General: There is no distension  Palpations: Abdomen is soft  Musculoskeletal:         General: No tenderness  Right lower leg: No edema  Left lower leg: No edema  Skin:     General: Skin is warm and dry  Coloration: Skin is not jaundiced or pale  Neurological:      General: No focal deficit present  Mental Status: Mental status is at baseline  Motor: Abnormal muscle tone (upper extremities- increased) present  Psychiatric:         Attention and Perception: She is inattentive  Speech: She is noncommunicative  Behavior: Behavior is withdrawn  Behavior is cooperative  Cognition and Memory: Cognition is impaired  Memory is impaired         Assessment/Plan:  80year old female with:    Hypothyroidism  Continue levothyroxine  Order TSH, free T4    Chronic deep vein thrombosis (DVT) (HCC)  Chronic RLE DVT  Continue xarelto; at high risk for recurrent thrombotic event in setting of immobility   Order BMP, CBC to reassess renal function and blood counts on chronic anticoagulation    Late onset Alzheimer's disease without behavioral disturbance  Continue supportive care  Continue LTCF for 24/7 and ADL care including assist for feeding  Management of chronic medical conditions as outlined     Dysphagia  Continue modified diet  Aspiration precautions  Continue to monitor weights monthly    Carolann Madera DO  3/11/21

## 2021-03-16 NOTE — ASSESSMENT & PLAN NOTE
Chronic RLE DVT  Continue xarelto; at high risk for recurrent thrombotic event in setting of immobility   Order BMP, CBC to reassess renal function and blood counts on chronic anticoagulation

## 2021-04-13 ENCOUNTER — NURSING HOME VISIT (OUTPATIENT)
Dept: GERIATRICS | Facility: OTHER | Age: 86
End: 2021-04-13
Payer: MEDICARE

## 2021-04-13 DIAGNOSIS — E03.9 HYPOTHYROIDISM, UNSPECIFIED TYPE: Primary | ICD-10-CM

## 2021-04-13 DIAGNOSIS — I82.501 CHRONIC DEEP VEIN THROMBOSIS (DVT) OF RIGHT LOWER EXTREMITY, UNSPECIFIED VEIN (HCC): ICD-10-CM

## 2021-04-13 DIAGNOSIS — G30.1 LATE ONSET ALZHEIMER'S DISEASE WITHOUT BEHAVIORAL DISTURBANCE (HCC): ICD-10-CM

## 2021-04-13 DIAGNOSIS — F02.80 LATE ONSET ALZHEIMER'S DISEASE WITHOUT BEHAVIORAL DISTURBANCE (HCC): ICD-10-CM

## 2021-04-13 DIAGNOSIS — R13.10 DYSPHAGIA, UNSPECIFIED TYPE: ICD-10-CM

## 2021-04-13 PROCEDURE — 99309 SBSQ NF CARE MODERATE MDM 30: CPT | Performed by: FAMILY MEDICINE

## 2021-04-13 NOTE — PROGRESS NOTES
[facility]   [address] [facility], [facility], [address]  Progress Note  POS: Nursing Facility/LTC-32    Unable to obtain from patient due to: Dementia; additional history obtained speaking with nursing/nursing note review  Chief Complaint/Reason for visit: Follow up of chronic medical conditions  History of Present Illness: 80year old female evaluated for routine follow up of chronic medical conditions  Weight -6lb over past month  Continues on levothyroxine for hypothyroidism, labs recently obtained and WNL  Continues on xarelto for chronic DVT; most recent CMP and CBC also WNL  Past Medical History: Reviewed and unchanged  Family History: unchanged from history and physical  Social History: unchanged from history and physical  Resident Since: 8/7/13  Review of systems: Review of Systems   Unable to perform ROS: Dementia   Constitutional: Positive for unexpected weight change  Negative for fever  Medications: No changes made  Medication list reviewed and updated in Epic to reflect most current SNF orders  Allergies: NKDA  Labs/Diagnostics (reviewed by this provider): Copy in Chart  TSH:(3/31/21) 2 55; Free T4:1 34  CBC:(3/31/21) Hb:13 1 Hct:40 9 WBC:7 9 PLT:191  CMP: (3/31/21) Na:142 K:4 4 Cl:105 CO:30 BUN:19 Cr:0 52 Glu:75 Ca:9 0   AST:19 ALT:23 Alk-P:61 Tprotein:6 7 Albumin:3 1   Tbili:0 4    Physical Exam    Weight: 154 2lb Temp:98F BP:122/61  Pulse:76 Resp:18  Constitutional: Well-nourished and Normocephalic    Physical Exam  Vitals signs and nursing note reviewed  Constitutional:       General: She is awake  She is not in acute distress  Appearance: She is well-developed and well-groomed  She is not ill-appearing, toxic-appearing or diaphoretic  HENT:      Head: Normocephalic and atraumatic  Right Ear: External ear normal       Left Ear: External ear normal       Nose: No rhinorrhea  Mouth/Throat:      Mouth: Mucous membranes are moist       Pharynx: Oropharynx is clear     Eyes: General: No scleral icterus  Right eye: No discharge  Left eye: No discharge  Conjunctiva/sclera: Conjunctivae normal    Neck:      Musculoskeletal: Neck supple  No neck rigidity  Cardiovascular:      Rate and Rhythm: Normal rate and regular rhythm  Heart sounds: S1 normal and S2 normal    Pulmonary:      Effort: Pulmonary effort is normal  No respiratory distress  Breath sounds: No wheezing  Abdominal:      General: Bowel sounds are normal  There is no distension  Palpations: Abdomen is soft  Tenderness: There is no abdominal tenderness  Musculoskeletal:         General: No tenderness  Right lower leg: No edema  Left lower leg: No edema  Skin:     General: Skin is warm and dry  Coloration: Skin is not jaundiced or pale  Neurological:      Mental Status: She is alert  Mental status is at baseline  Cranial Nerves: No cranial nerve deficit  Motor: Abnormal muscle tone (Increased tone LUE>RUE) present  No tremor  Psychiatric:         Attention and Perception: She is inattentive  Mood and Affect: Affect is flat  Speech: She is noncommunicative  Behavior: Behavior is slowed and withdrawn  Behavior is cooperative  Cognition and Memory: Cognition is impaired  Memory is impaired         Assessment/Plan:  80year old female with:    Hypothyroidism  Continue levothyroxine  TSH, Free T4 WNL; recheck in one year or sooner if continued decline in weight    Chronic deep vein thrombosis (DVT) (HCC)  Chronic RLE DVT  Continue xarelto; at high risk for recurrent thrombotic event in setting of immobility     Dysphagia  Continue modified diet  Aspiration precautions  Continue to monitor weights monthly    Late onset Alzheimer's disease without behavioral disturbance  Continue supportive care  Continue LTCF for 24/7 and ADL care including assist for feeding  Management of chronic medical conditions as outlined     Mai Jarvis [address],   4/13/21

## 2021-05-12 ENCOUNTER — NURSING HOME VISIT (OUTPATIENT)
Dept: GERIATRICS | Facility: OTHER | Age: 86
End: 2021-05-12
Payer: MEDICARE

## 2021-05-12 DIAGNOSIS — G30.1 LATE ONSET ALZHEIMER'S DISEASE WITHOUT BEHAVIORAL DISTURBANCE (HCC): Primary | ICD-10-CM

## 2021-05-12 DIAGNOSIS — R53.81 DEBILITY: ICD-10-CM

## 2021-05-12 DIAGNOSIS — E03.9 HYPOTHYROIDISM, UNSPECIFIED TYPE: ICD-10-CM

## 2021-05-12 DIAGNOSIS — F02.80 LATE ONSET ALZHEIMER'S DISEASE WITHOUT BEHAVIORAL DISTURBANCE (HCC): Primary | ICD-10-CM

## 2021-05-12 PROCEDURE — 99308 SBSQ NF CARE LOW MDM 20: CPT | Performed by: FAMILY MEDICINE

## 2021-05-12 NOTE — PROGRESS NOTES
[facility]   [address] [facility], [place], [address]  Progress Note  POS: Nursing Facility/LTC-32    Unable to obtain from patient due to: [address]; additional history obtained speaking with nursing/ nursing note review  Chief Complaint/Reason for visit: Follow up of chronic medical conditions   History of Present Illness: 80year old female evaluated for routine follow up of chronic medical conditions  6lb weight gain noted over past month; no lower extremity edema noted  Past Medical History: unchanged from history and physical  Family History: unchanged from history and physical  Social History: unchanged from history and physical  Resident Since: 8/7/13  Review of systems: Review of Systems   Unable to perform ROS: Dementia     Medications: No changes made  Medication list reviewed and updated in Epic to reflect most current SNF orders  Allergies: NKDA    Physical Exam    Weight: 160 4lb Temp:97 3F BP:134/67 Pulse:89 Resp:18  Constitutional: Well-nourished and Normocephalic  Orientation:Person     Physical Exam  Vitals and nursing note reviewed  Constitutional:       General: She is sleeping  She is not in acute distress  Appearance: She is well-developed and well-groomed  She is not ill-appearing, toxic-appearing or diaphoretic  HENT:      Head: Normocephalic and atraumatic  Right Ear: External ear normal       Left Ear: External ear normal       Nose: No rhinorrhea  Mouth/Throat:      Mouth: Mucous membranes are moist       Pharynx: Oropharynx is clear  Eyes:      General: No scleral icterus  Right eye: No discharge  Left eye: No discharge  Conjunctiva/sclera: Conjunctivae normal    Cardiovascular:      Rate and Rhythm: Normal rate and regular rhythm  Heart sounds: S1 normal and S2 normal    Pulmonary:      Effort: Pulmonary effort is normal  No respiratory distress  Breath sounds: No wheezing  Abdominal:      General: There is no distension  Palpations: Abdomen is soft  Musculoskeletal:         General: No tenderness  Deformity:        Cervical back: No rigidity  Right lower leg: No edema  Left lower leg: No edema  Skin:     General: Skin is warm and dry  Capillary Refill: Capillary refill takes less than 2 seconds  Coloration: Skin is not jaundiced or pale  Neurological:      General: No focal deficit present  Mental Status: She is easily aroused  Mental status is at baseline  Cranial Nerves: No cranial nerve deficit  Psychiatric:         Attention and Perception: She is inattentive  Mood and Affect: Affect is flat  Speech: She is noncommunicative  Behavior: Behavior is cooperative  Cognition and Memory: Cognition is impaired  Memory is impaired         Assessment/Plan:  80year old female with:    Late onset Alzheimer's disease without behavioral disturbance ([facility] [address] )  Continue supportive care  Continue LTCF for 24/7 and ADL care including assist for feeding  Management of chronic medical conditions as outlined     Hypothyroidism  Continue levothyroxine  Recent TSH, Free T4 WNL; recheck yearly, next due April 2022    Debility  Multifactorial  Continue LTC for 24/7 and ADL care  Supportive care, nutritional support    Tessa Hutchinson DO  5/12/21

## 2021-07-08 ENCOUNTER — NURSING HOME VISIT (OUTPATIENT)
Dept: GERIATRICS | Facility: OTHER | Age: 86
End: 2021-07-08
Payer: MEDICARE

## 2021-07-08 DIAGNOSIS — I82.501 CHRONIC DEEP VEIN THROMBOSIS (DVT) OF RIGHT LOWER EXTREMITY, UNSPECIFIED VEIN (HCC): ICD-10-CM

## 2021-07-08 DIAGNOSIS — G30.1 LATE ONSET ALZHEIMER'S DISEASE WITHOUT BEHAVIORAL DISTURBANCE (HCC): ICD-10-CM

## 2021-07-08 DIAGNOSIS — F02.80 LATE ONSET ALZHEIMER'S DISEASE WITHOUT BEHAVIORAL DISTURBANCE (HCC): ICD-10-CM

## 2021-07-08 DIAGNOSIS — E03.9 HYPOTHYROIDISM, UNSPECIFIED TYPE: Primary | ICD-10-CM

## 2021-07-08 PROCEDURE — 99309 SBSQ NF CARE MODERATE MDM 30: CPT | Performed by: FAMILY MEDICINE

## 2021-07-08 NOTE — ASSESSMENT & PLAN NOTE
Chronic RLE DVT  Continue xarelto indefinitely; at high risk for recurrent thrombotic event in setting of immobility

## 2021-07-08 NOTE — PROGRESS NOTES
[address], [place], [address]  Progress Note  POS: Nursing Facility/LTC-32    Unable to obtain from patient due to:  unable to obtain from patient: Dementia; additional history obtained speaking with nursing/nursing note review  Chief Complaint/Reason for visit: Follow up of chronic medical conditions   History of Present Illness: 80year old female evaluated for routine follow up of chronic medical conditions  She is alert, interactive at time of my visit today; responds to questions and conversation with nonsensical sounds  Weight increase of 2 6lb over past 2 months; no edema reported  Past Medical History: unchanged from history and physical  Family History: unchanged from history and physical  Social History: unchanged from history and physical  Resident Since: 8/7/13  Review of systems: Review of Systems   Unable to perform ROS: Dementia     Medications: No changes made  Allergies: NKDA    Physical Exam    Weight: 163lb Temp:97 5F BP:101/59 Pulse:84 Resp:18 O2 Sat:94%RA  Constitutional: Well-nourished and Normocephalic  Orientation:Person     Physical Exam  Vitals and nursing note reviewed  Constitutional:       General: She is awake  She is not in acute distress  Appearance: She is well-developed and well-groomed  She is not ill-appearing, toxic-appearing or diaphoretic  HENT:      Head: Normocephalic and atraumatic  Right Ear: External ear normal       Left Ear: External ear normal       Nose: No rhinorrhea  Mouth/Throat:      Mouth: Mucous membranes are moist       Pharynx: Oropharynx is clear  Eyes:      General: No scleral icterus  Right eye: No discharge  Left eye: No discharge  Conjunctiva/sclera: Conjunctivae normal    Cardiovascular:      Rate and Rhythm: Normal rate and regular rhythm  Heart sounds: S1 normal and S2 normal    Pulmonary:      Effort: Pulmonary effort is normal  No respiratory distress  Breath sounds:  No wheezing  Abdominal:      General: Bowel sounds are normal  There is no distension  Palpations: Abdomen is soft  Musculoskeletal:         General: No deformity  Cervical back: No rigidity  Right lower leg: No edema  Left lower leg: No edema  Skin:     General: Skin is warm and dry  Coloration: Skin is not jaundiced or pale  Neurological:      Mental Status: She is alert  Mental status is at baseline  Cranial Nerves: No cranial nerve deficit  Psychiatric:         Behavior: Behavior is cooperative  Cognition and Memory: Cognition is impaired  Memory is impaired         Assessment/Plan:  80year old female with:    Hypothyroidism  Continue levothyroxine  Most recent TSH, Free T4 WNL; recheck yearly, next due April 2022    Chronic deep vein thrombosis (DVT) (HCC)  Chronic RLE DVT  Continue xarelto indefinitely; at high risk for recurrent thrombotic event in setting of immobility     Late onset Alzheimer's disease without behavioral disturbance ([facility] [address] )  Continue supportive care  Continue LTCF for 24/7 and ADL care including assist for feeding  Management of chronic medical conditions as outlined     Marcelina Mcghee,   7/8/21

## 2021-08-06 ENCOUNTER — NURSING HOME VISIT (OUTPATIENT)
Dept: GERIATRICS | Facility: OTHER | Age: 86
End: 2021-08-06
Payer: MEDICARE

## 2021-08-06 DIAGNOSIS — M62.838 MUSCLE SPASM: Primary | ICD-10-CM

## 2021-08-06 DIAGNOSIS — G30.1 LATE ONSET ALZHEIMER'S DISEASE WITHOUT BEHAVIORAL DISTURBANCE (HCC): ICD-10-CM

## 2021-08-06 DIAGNOSIS — F02.80 LATE ONSET ALZHEIMER'S DISEASE WITHOUT BEHAVIORAL DISTURBANCE (HCC): ICD-10-CM

## 2021-08-06 DIAGNOSIS — R13.10 DYSPHAGIA, UNSPECIFIED TYPE: ICD-10-CM

## 2021-08-06 PROCEDURE — 99308 SBSQ NF CARE LOW MDM 20: CPT | Performed by: FAMILY MEDICINE

## 2021-08-09 NOTE — ASSESSMENT & PLAN NOTE
Continue modified diet  Aspiration precautions  Continue to monitor weights monthly- have been stable per review

## 2021-08-09 NOTE — ASSESSMENT & PLAN NOTE
With increased tone of b/l upper extremities  In setting of advanced dementia  Continue baclofen, repositioning; monitor skin closely

## 2021-10-12 ENCOUNTER — NURSING HOME VISIT (OUTPATIENT)
Dept: GERIATRICS | Facility: OTHER | Age: 86
End: 2021-10-12
Payer: MEDICARE

## 2021-10-12 DIAGNOSIS — I82.501 CHRONIC DEEP VEIN THROMBOSIS (DVT) OF RIGHT LOWER EXTREMITY, UNSPECIFIED VEIN (HCC): ICD-10-CM

## 2021-10-12 DIAGNOSIS — E03.9 HYPOTHYROIDISM, UNSPECIFIED TYPE: Primary | ICD-10-CM

## 2021-10-12 DIAGNOSIS — M62.838 MUSCLE SPASM: ICD-10-CM

## 2021-10-12 DIAGNOSIS — F02.80 LATE ONSET ALZHEIMER'S DISEASE WITHOUT BEHAVIORAL DISTURBANCE (HCC): ICD-10-CM

## 2021-10-12 DIAGNOSIS — G30.1 LATE ONSET ALZHEIMER'S DISEASE WITHOUT BEHAVIORAL DISTURBANCE (HCC): ICD-10-CM

## 2021-10-12 PROCEDURE — 99308 SBSQ NF CARE LOW MDM 20: CPT | Performed by: FAMILY MEDICINE

## 2021-11-09 ENCOUNTER — NURSING HOME VISIT (OUTPATIENT)
Dept: GERIATRICS | Facility: OTHER | Age: 86
End: 2021-11-09
Payer: MEDICARE

## 2021-11-09 DIAGNOSIS — M62.838 MUSCLE SPASM: ICD-10-CM

## 2021-11-09 DIAGNOSIS — F02.80 LATE ONSET ALZHEIMER'S DISEASE WITHOUT BEHAVIORAL DISTURBANCE (HCC): Primary | ICD-10-CM

## 2021-11-09 DIAGNOSIS — I82.501 CHRONIC DEEP VEIN THROMBOSIS (DVT) OF RIGHT LOWER EXTREMITY, UNSPECIFIED VEIN (HCC): ICD-10-CM

## 2021-11-09 DIAGNOSIS — E03.9 HYPOTHYROIDISM, UNSPECIFIED TYPE: ICD-10-CM

## 2021-11-09 DIAGNOSIS — G30.1 LATE ONSET ALZHEIMER'S DISEASE WITHOUT BEHAVIORAL DISTURBANCE (HCC): Primary | ICD-10-CM

## 2021-11-09 PROCEDURE — 99308 SBSQ NF CARE LOW MDM 20: CPT | Performed by: FAMILY MEDICINE

## 2021-12-14 ENCOUNTER — NURSING HOME VISIT (OUTPATIENT)
Dept: GERIATRICS | Facility: OTHER | Age: 86
End: 2021-12-14
Payer: MEDICARE

## 2021-12-14 DIAGNOSIS — I82.501 CHRONIC DEEP VEIN THROMBOSIS (DVT) OF RIGHT LOWER EXTREMITY, UNSPECIFIED VEIN (HCC): ICD-10-CM

## 2021-12-14 DIAGNOSIS — M62.838 MUSCLE SPASM: ICD-10-CM

## 2021-12-14 DIAGNOSIS — G30.1 LATE ONSET ALZHEIMER'S DISEASE WITHOUT BEHAVIORAL DISTURBANCE (HCC): Primary | ICD-10-CM

## 2021-12-14 DIAGNOSIS — E03.9 HYPOTHYROIDISM, UNSPECIFIED TYPE: ICD-10-CM

## 2021-12-14 DIAGNOSIS — F02.80 LATE ONSET ALZHEIMER'S DISEASE WITHOUT BEHAVIORAL DISTURBANCE (HCC): Primary | ICD-10-CM

## 2021-12-14 PROCEDURE — 99309 SBSQ NF CARE MODERATE MDM 30: CPT | Performed by: FAMILY MEDICINE

## 2022-01-10 ENCOUNTER — NURSING HOME VISIT (OUTPATIENT)
Dept: GERIATRICS | Facility: OTHER | Age: 87
End: 2022-01-10
Payer: MEDICARE

## 2022-01-10 DIAGNOSIS — E03.9 HYPOTHYROIDISM, UNSPECIFIED TYPE: ICD-10-CM

## 2022-01-10 DIAGNOSIS — F02.80 LATE ONSET ALZHEIMER'S DISEASE WITHOUT BEHAVIORAL DISTURBANCE (HCC): Primary | ICD-10-CM

## 2022-01-10 DIAGNOSIS — G30.1 LATE ONSET ALZHEIMER'S DISEASE WITHOUT BEHAVIORAL DISTURBANCE (HCC): Primary | ICD-10-CM

## 2022-01-10 DIAGNOSIS — I82.501 CHRONIC DEEP VEIN THROMBOSIS (DVT) OF RIGHT LOWER EXTREMITY, UNSPECIFIED VEIN (HCC): ICD-10-CM

## 2022-01-10 DIAGNOSIS — M62.838 MUSCLE SPASM: ICD-10-CM

## 2022-01-10 PROCEDURE — 99309 SBSQ NF CARE MODERATE MDM 30: CPT | Performed by: FAMILY MEDICINE

## 2022-01-11 NOTE — ASSESSMENT & PLAN NOTE
· Chronic RLE DVT with minimal varicosities     · Will need lifelong anticoagulation   · CBC 12/21 wnl-no evidence of anemia or thrombocytopenia  · Electrolytes wnl with stable kidney and liver function per CMP 12/2021  · Continue Xarelto 15 mg qd

## 2022-01-11 NOTE — PROGRESS NOTES
[facility]  [address]  [phone]  [address] of Service: nursing home place of service: POS 32 Unskilled- No Part A Coverage    NAME: Vandana Morales  AGE: 80 y o  SEX: female [id number]    DATE OF ENCOUNTER: 1/11/2022    Assessment and Plan   Late onset Alzheimer's disease without behavioral disturbance ([facility] )  · Continue management for chronic conditions  · Continue care for ADL's  · Redirect as needed    Chronic deep vein thrombosis (DVT) (HCC)  · Chronic RLE DVT with minimal varicosities  · Will need lifelong anticoagulation   · CBC 12/21 wnl-no evidence of anemia or thrombocytopenia  · Electrolytes wnl with stable kidney and liver function per CMP 12/2021  · Continue Xarelto 15 mg qd        Hypothyroidism  · Chronic, stable  · Last TSH wnl, next scheduled TSH 4/2022    · Continue Levothyroxine 50 mcg    Muscle spasm  · Stable  · Continue Baclofen 10 mg qd       Chief Complaint     Follow up No chief complaint on file  History of Present Illness     Merline Owen is a 80 y o  female with a PMHx of Late onset Alzheimer's, Hypothyroidism, Chronic DVT, Bowel & Bladder incontinence who was seen today for follow up         The following portions of the patient's history were reviewed and updated as appropriate: allergies, current medications, past family history, past medical history, past social history, past surgical history and problem list     Review of Systems     Review of Systems   Unable to perform ROS: Dementia     Active Problem List     Patient Active Problem List   Diagnosis    Debility    Late onset Alzheimer's disease without behavioral disturbance ([facility] )    Hypothyroidism    Chronic deep vein thrombosis (DVT) ([facility] )    Periodontal disease    Dysphagia    Muscle spasm    Bowel and bladder incontinence     Objective     Vital Signs:     Blood pressure: 114/61 Heart Rate: 82 Respiratory Rate 18  Temperature 98 6 Weight 168 lbs    Physical Exam  Constitutional:       Appearance: She is well-developed  Comments: Resting comfortably in wheelchair   HENT:      Head: Normocephalic and atraumatic  Eyes:      Pupils: Pupils are equal, round, and reactive to light  Cardiovascular:      Rate and Rhythm: Normal rate and regular rhythm  Heart sounds: Normal heart sounds  No murmur heard  No gallop  Pulmonary:      Effort: Pulmonary effort is normal  No respiratory distress  Breath sounds: Normal breath sounds  No wheezing or rales  Abdominal:      General: Bowel sounds are normal      Palpations: Abdomen is soft  There is no mass  Tenderness: There is no abdominal tenderness  There is no guarding or rebound  Hernia: No hernia is present  Musculoskeletal:         General: Normal range of motion  Cervical back: Normal range of motion and neck supple  Right lower leg: No edema  Left lower leg: No edema  Skin:     General: Skin is warm  Findings: No erythema or rash  Neurological:      Mental Status: She is disoriented  Pertinent Laboratory/Diagnostic Studies:  Laboratory and Imaging studies reviewed  Full report in the paper chart  Current Medications   Medications reviewed and updated in facility chart      Name: Lenin Berger  : 1932  MRN: [id number]      Jonna Lipscomb MD  Geriatric Medicine  2022 4:07 PM

## 2022-02-08 ENCOUNTER — NURSING HOME VISIT (OUTPATIENT)
Dept: GERIATRICS | Facility: OTHER | Age: 87
End: 2022-02-08
Payer: MEDICARE

## 2022-02-08 DIAGNOSIS — M62.838 MUSCLE SPASM: ICD-10-CM

## 2022-02-08 DIAGNOSIS — E03.9 HYPOTHYROIDISM, UNSPECIFIED TYPE: ICD-10-CM

## 2022-02-08 DIAGNOSIS — R13.10 DYSPHAGIA, UNSPECIFIED TYPE: ICD-10-CM

## 2022-02-08 DIAGNOSIS — F02.80 LATE ONSET ALZHEIMER'S DISEASE WITHOUT BEHAVIORAL DISTURBANCE (HCC): Primary | ICD-10-CM

## 2022-02-08 DIAGNOSIS — G30.1 LATE ONSET ALZHEIMER'S DISEASE WITHOUT BEHAVIORAL DISTURBANCE (HCC): Primary | ICD-10-CM

## 2022-02-08 DIAGNOSIS — I82.501 CHRONIC DEEP VEIN THROMBOSIS (DVT) OF RIGHT LOWER EXTREMITY, UNSPECIFIED VEIN (HCC): ICD-10-CM

## 2022-02-08 PROCEDURE — 99308 SBSQ NF CARE LOW MDM 20: CPT | Performed by: FAMILY MEDICINE

## 2022-02-08 NOTE — PROGRESS NOTES
[facility]  [address]  [phone]  [address] of Service: nursing home place of service: POS 32 Unskilled- No Part A Coverage    NAME: Santy Morales  AGE: 80 y o  SEX: female [id number]    DATE OF ENCOUNTER: 2/8/2022    Assessment and Plan     Chronic deep vein thrombosis (DVT) (HCC)  · Chronic RLE DVT with minimal varicosities  · Will need lifelong anticoagulation   · CBC 12/21 wnl-no evidence of anemia or thrombocytopenia  · Electrolytes wnl with stable kidney and liver function per CMP 12/2021  · Will recheck CMP in spring   · Continue Xarelto 15 mg qd        Late onset Alzheimer's disease without behavioral disturbance (HCC)  · Continue management for chronic conditions  · Continue care for ADL's  · Redirect as needed    Muscle spasm  · Stable  · Continue Baclofen 10 mg qd     Hypothyroidism  · Chronic, stable  · Last TSH wnl, next scheduled TSH 4/2022    · Continue Levothyroxine 50 mcg    Dysphagia  · Continue Dysphagia Diet   · Continue to monitor weights     Chief Complaint     Follow up No chief complaint on file  History of Present Illness     Eva Xiong is a 80 y o  female with a PMHx of Late onset Alzheimer's, Hypothyroidism, Chronic DVT, Bowel & Bladder incontinence who was seen today for follow up  Currently, Ramon Lu is in no acute distress resting comfortably watching television  Per nursing, patient is doing well  Podiatry saw patient on 1/27/22 and had a clipping of nails       The following portions of the patient's history were reviewed and updated as appropriate: allergies, current medications, past family history, past medical history, past social history, past surgical history and problem list     Review of Systems     Review of Systems   Unable to perform ROS: Dementia     Active Problem List     Patient Active Problem List   Diagnosis    Debility    Late onset Alzheimer's disease without behavioral disturbance ([facility] [address] )    Hypothyroidism    [facility]) [facility])    Periodontal disease    Dysphagia    Muscle spasm    Bowel and bladder incontinence     Objective     Vital Signs:     Blood pressure 121/80 Heart Rate:90    Respiratory Rate 18   Temperature 98 2 Oxygen Saturation 98 Weight 169 lbs    Physical Exam  Constitutional:       Appearance: She is well-developed  Comments: Resting comfortably in wheelchair   HENT:      Head: Normocephalic and atraumatic  Eyes:      Pupils: Pupils are equal, round, and reactive to light  Cardiovascular:      Rate and Rhythm: Normal rate and regular rhythm  Heart sounds: Normal heart sounds  No murmur heard  No gallop  Pulmonary:      Effort: Pulmonary effort is normal  No respiratory distress  Breath sounds: Normal breath sounds  No wheezing or rales  Abdominal:      General: Bowel sounds are normal  There is no distension  Palpations: Abdomen is soft  There is no mass  Tenderness: There is no abdominal tenderness  There is no guarding or rebound  Hernia: No hernia is present  Musculoskeletal:         General: Normal range of motion  Cervical back: Normal range of motion and neck supple  Right lower leg: No edema  Left lower leg: No edema  Skin:     General: Skin is warm  Findings: No erythema or rash  Psychiatric:         Behavior: Behavior is withdrawn  Pertinent Laboratory/Diagnostic Studies:  Laboratory and Imaging studies reviewed  Full report in the paper chart  Current Medications   Medications reviewed and updated in facility chart      Name: Andressa Laser  : 1932  MRN: [id number]    Vida Bender MD  Geriatric Medicine  2022 3:34 PM

## 2022-02-08 NOTE — ASSESSMENT & PLAN NOTE
· Chronic RLE DVT with minimal varicosities     · Will need lifelong anticoagulation   · CBC 12/21 wnl-no evidence of anemia or thrombocytopenia  · Electrolytes wnl with stable kidney and liver function per CMP 12/2021  · Will recheck CMP in spring   · Continue Xarelto 15 mg qd

## 2022-03-08 ENCOUNTER — NURSING HOME VISIT (OUTPATIENT)
Dept: GERIATRICS | Facility: OTHER | Age: 87
End: 2022-03-08
Payer: MEDICARE

## 2022-03-08 DIAGNOSIS — G30.1 LATE ONSET ALZHEIMER'S DISEASE WITHOUT BEHAVIORAL DISTURBANCE (HCC): Primary | ICD-10-CM

## 2022-03-08 DIAGNOSIS — F02.80 LATE ONSET ALZHEIMER'S DISEASE WITHOUT BEHAVIORAL DISTURBANCE (HCC): Primary | ICD-10-CM

## 2022-03-08 DIAGNOSIS — I82.501 CHRONIC DEEP VEIN THROMBOSIS (DVT) OF RIGHT LOWER EXTREMITY, UNSPECIFIED VEIN (HCC): ICD-10-CM

## 2022-03-08 PROBLEM — K59.09 OTHER CONSTIPATION: Status: ACTIVE | Noted: 2022-03-08

## 2022-03-08 PROCEDURE — 99308 SBSQ NF CARE LOW MDM 20: CPT | Performed by: FAMILY MEDICINE

## 2022-03-08 NOTE — ASSESSMENT & PLAN NOTE
· Adequate bowel movements presently   · Continue Magnesium Hydroxide, Dulcolax suppository, Miralax prn

## 2022-03-08 NOTE — ASSESSMENT & PLAN NOTE
· Chronic RLE DVT with minimal varicosities     · Will need lifelong anticoagulation   · CBC 12/21 wnl-no evidence of anemia or thrombocytopenia  · Electrolytes wnl with stable kidney and liver function per CMP 12/2021  · Will recheck CMP in 1-2 months   · Continue Xarelto 15 mg qd

## 2022-03-08 NOTE — PROGRESS NOTES
[facility]  [address]  [phone]  [address] of Service: nursing home place of service: POS 32 Unskilled- No Part A Coverage      NAME: Lavonne Morales  AGE: 80 y o  SEX: female [id number]    DATE OF ENCOUNTER: 3/8/2022    Assessment and Plan   Late onset Alzheimer's disease without behavioral disturbance ([facility] [address] )  · Continue management for chronic conditions  · Continue care for ADL's  · Redirect as needed    Chronic deep vein thrombosis (DVT) (HCC)  · Chronic RLE DVT with minimal varicosities  · Will need lifelong anticoagulation   · CBC 12/21 wnl-no evidence of anemia or thrombocytopenia  · Electrolytes wnl with stable kidney and liver function per CMP 12/2021  · Will recheck CMP in 1-2 months   · Continue Xarelto 15 mg qd        Hypothyroidism  · Chronic, stable  · Last TSH wnl, next scheduled TSH 4/2022    · Continue Levothyroxine 50 mcg    Muscle spasm  · Stable  · Continue Baclofen 10 mg qd     Other constipation  · Adequate bowel movements presently   · Continue Magnesium Hydroxide, Dulcolax suppository, Miralax prn  Chief Complaint     Follow up No chief complaint on file  History of Present Illness     Mai Mortensen is a 80 y o  female PMHx of Late onset Alzheimer's, Hypothyroidism, Chronic DVT, Bowel & Bladder incontinence who was seen today for follow up  who was seen today for follow up  No new issues since last visit  Patient is currently resting comfortably in bed sleeping   No current concerns per nursing    The following portions of the patient's history were reviewed and updated as appropriate: allergies, current medications, past family history, past medical history, past social history, past surgical history and problem list     Review of Systems     Review of Systems   Unable to perform ROS: Dementia     Active Problem List     Patient Active Problem List   Diagnosis    Debility    Late onset Alzheimer's disease without behavioral disturbance (HCC)    Hypothyroidism    Chronic deep vein thrombosis (DVT) (HCC)    Periodontal disease    Dysphagia    Muscle spasm    Bowel and bladder incontinence    Other constipation       Objective     Vital Signs:     Blood pressure 97/58 Heart Rate: 86 Respiratory Rate 18   Temperature 98 3 Oxygen Saturation 98 Weight 169 lbs    Physical Exam  Constitutional:       Appearance: She is well-developed  Comments: Resting comfortably in bed, sleeping   HENT:      Head: Normocephalic and atraumatic  Cardiovascular:      Rate and Rhythm: Normal rate and regular rhythm  Heart sounds: Normal heart sounds  No murmur heard  No gallop  Pulmonary:      Effort: Pulmonary effort is normal  No respiratory distress  Breath sounds: Normal breath sounds  No wheezing or rales  Abdominal:      General: Bowel sounds are normal  There is no distension  Palpations: Abdomen is soft  There is no mass  Tenderness: There is no abdominal tenderness  There is no guarding or rebound  Hernia: No hernia is present  Musculoskeletal:      Right lower leg: No edema  Left lower leg: No edema  Skin:     General: Skin is warm  Findings: No erythema or rash  Neurological:      Mental Status: Mental status is at baseline  Pertinent Laboratory/Diagnostic Studies:  Laboratory and Imaging studies reviewed  Full report in the paper chart  Current Medications   Medications reviewed and updated in facility chart      Name: Yeison Aguirre  : 1932  MRN: [id number]    Ngozi Shah MD  Geriatric Medicine  3/8/2022 2:54 PM

## 2022-03-22 ENCOUNTER — NURSING HOME VISIT (OUTPATIENT)
Dept: GERIATRICS | Facility: OTHER | Age: 87
End: 2022-03-22
Payer: MEDICARE

## 2022-03-22 ENCOUNTER — TELEPHONE (OUTPATIENT)
Dept: GERIATRICS | Facility: OTHER | Age: 87
End: 2022-03-22

## 2022-03-22 DIAGNOSIS — E03.9 HYPOTHYROIDISM, UNSPECIFIED TYPE: ICD-10-CM

## 2022-03-22 DIAGNOSIS — R63.0 POOR APPETITE: Primary | ICD-10-CM

## 2022-03-22 DIAGNOSIS — F02.80 LATE ONSET ALZHEIMER'S DISEASE WITHOUT BEHAVIORAL DISTURBANCE (HCC): ICD-10-CM

## 2022-03-22 DIAGNOSIS — G30.1 LATE ONSET ALZHEIMER'S DISEASE WITHOUT BEHAVIORAL DISTURBANCE (HCC): ICD-10-CM

## 2022-03-22 DIAGNOSIS — F45.8 BRUXISM (TEETH GRINDING): ICD-10-CM

## 2022-03-22 PROCEDURE — 99309 SBSQ NF CARE MODERATE MDM 30: CPT | Performed by: NURSE PRACTITIONER

## 2022-03-22 NOTE — ASSESSMENT & PLAN NOTE
Progressive decline  Patient requires complete assistance with all ADLs; IADLs  Continue 24/7 care and support at [facility]   Use [employer] lift when transporting patient in and out of bed  Prevention of skin breakdown measures in place by nursing

## 2022-03-22 NOTE — ASSESSMENT & PLAN NOTE
Nursing reports that patient has been grinding her teeth continuously with decreased appetite and oral intake  Nurse's aide reports that she has been feeding her baby food from a squeeze container when she does not eat  Nurse's aide stated that patient has been grinding her teeth for approximately 2 weeks now and daughter visits daily and aware  Teeth grinding may be due to to ache or mouth pain  Patient is very resistant and unable to examine patient's mouth    Left message with patient's daughter Merlinda Monday to call me regarding plan of care

## 2022-03-22 NOTE — PROGRESS NOTES
Facility: [facility]  POS: 32 (LT)  Progress Note    Chief Complaint/Reason for visit: Teeth grinding and poor oral intake  History obtained from nursing staff and EMR  History of Present Illness:  80-year-old female seen and examined at the request of nursing for concern of poor oral intake and teeth grinding  Nurse's aide reports that patient has been grinding her teeth for approximately 2 to 3 weeks now and her oral intake has decreased  Patient has a history of periodontal disease and may have pain in her mouth which is causing her to grind her teeth  She was grinding her teeth continuously at time of visit and resistant to opening her mouth  I called patient's daughter Naun Lau and left a voice message for her to call me regarding clinical assessment findings and plan of care  Past Medical History: unchanged from history and physical  Past Medical History:   Diagnosis Date    [facility]'s dementia ([facility] [address] )     Anemia     Aphasia     [facility]congestive [facility] failure) [facility])     Depression     DVT (deep venous thrombosis) ([facility])     Hyperlipidemia     Hypothyroidism     OA (osteoarthritis)     Osteoporosis      Family History: unchanged from history and physical  Social History: unchanged from history and physical  Resident Since:  08/07/2013  Review of systems: Review of Systems   Unable to perform ROS: Dementia     Medications: All medication and routine orders were reviewed and updated  Allergies: NKDA  Consults reviewed: Other  Labs/Diagnostics (reviewed by this provider): Copy in Chart paper chart    Imaging Reviewed:  None today    Physical Exam    Weight:  169 lb Temp: 98 3          BP:  97/58       Pulse:  86 Resp:  18  O2 Sat:  95% on room air  Constitutional: Normocephalic  Orientation:  Alert, confused, and disoriented     Physical Exam  Vitals and nursing note reviewed  Exam conducted with a chaperone present (Nurse's aide present at bedside)     Constitutional: General: She is not in acute distress  Appearance: She is not ill-appearing, toxic-appearing or diaphoretic  Comments: Elderly female who appears with chronic illness  HENT:      Head: Normocephalic  Nose: No congestion or rhinorrhea  Mouth/Throat:      Mouth: Mucous membranes are moist       Pharynx: No oropharyngeal exudate  Comments: Grinding her teeth continuously at time of exam  Eyes:      General: No scleral icterus  Right eye: No discharge  Left eye: No discharge  Extraocular Movements: Extraocular movements intact  Conjunctiva/sclera: Conjunctivae normal       Pupils: Pupils are equal, round, and reactive to light  Cardiovascular:      Rate and Rhythm: Normal rate and regular rhythm  Pulses: Normal pulses  Pulmonary:      Effort: Pulmonary effort is normal  No respiratory distress  Breath sounds: No rhonchi  Abdominal:      General: There is no distension  Palpations: Abdomen is soft  Tenderness: There is no abdominal tenderness  There is no guarding  Musculoskeletal:      Cervical back: Neck supple  No rigidity  Right lower leg: No edema  Left lower leg: No edema  Comments: Resistant to care  Heel protectors in place  Lymphadenopathy:      Cervical: No cervical adenopathy  Skin:     General: Skin is warm and dry  Capillary Refill: Capillary refill takes less than 2 seconds  Neurological:      Mental Status: She is alert  Mental status is at baseline  Comments: Alert, confused, and disoriented  Nonverbal   Psychiatric:      Comments: Resistant to exam       Assessment/Plan:  45-year-old female with:    Poor appetite  Nursing reports that patient has been grinding her teeth continuously with decreased appetite and oral intake  Nurse's aide reports that she has been feeding her baby food from a squeeze container when she does not eat    Nurse's aide stated that patient has been grinding her teeth for approximately 2 weeks now and daughter visits daily and aware  Teeth grinding may be due to to ache or mouth pain  Patient is very resistant and unable to examine patient's mouth  Left message with patient's daughter [facility] to call me regarding plan of care    Bruxism (teeth grinding)  Patient with history of periodontal disease  Nursing reports that patient has been grinding her teeth continuously  No signs of infection noted on face or neck  No swollen glands noted  Unable to examine patient's mouth due to resistance  Patient's oral intake has been affected since patient has been grinding her teeth  Left message with patient's daughter to call regarding plan of care    Late onset Alzheimer's disease without behavioral disturbance ([facility] [address] )  Progressive decline  Patient requires complete assistance with all ADLs; IADLs  Continue 24/7 care and support at [facility]   Use [employer] lift when transporting patient in and out of bed  Prevention of skin breakdown measures in place by nursing    Hypothyroidism  Continue level thyroxine 50 mcg daily    This note was completed in part utilizing [employer] fluency direct voice recognition software  Grammatical errors, random word insertion, spelling mistakes, and incomplete sentences may be an occasional consequence of the system secondary to software limitations, ambient noise and hardware issues  At the time of dictation, efforts were made to edit, clarify and/or correct errors  Please read the chart carefully and recognize, using context, where substitutions have occurred  If you have any questions or concerns about the context, text or information contained within the body of this dictation, please contact myself, the provider, for further clarification      [address]  9/04/49168:44 AM

## 2022-03-22 NOTE — ASSESSMENT & PLAN NOTE
Patient with history of periodontal disease  Nursing reports that patient has been grinding her teeth continuously  No signs of infection noted on face or neck  No swollen glands noted  Unable to examine patient's mouth due to resistance  Patient's oral intake has been affected since patient has been grinding her teeth   Patient can be treated with SSRI, antianxiety medications, and or dental exam but will wait for patient's daughter to call to discuss plan of care

## 2022-03-23 NOTE — TELEPHONE ENCOUNTER
I left a voicemail for patient's daughter Twan Kamara to call me back after I saw patient  Javan Woods called me back to discuss her mother's teeth grinding and appetite  Javan Woods stated that she is aware of her mother's teeth grinding for the past 2-3 weeks now but does not feel that her mother has declined  She is not a candidate for dental exam/teeth pulling due to age and comorbidities  She feels that it is best just to monitor her mother for now

## 2022-04-07 ENCOUNTER — NURSING HOME VISIT (OUTPATIENT)
Dept: GERIATRICS | Facility: OTHER | Age: 87
End: 2022-04-07
Payer: MEDICARE

## 2022-04-07 DIAGNOSIS — K05.6 PERIODONTAL DISEASE: ICD-10-CM

## 2022-04-07 DIAGNOSIS — R13.10 DYSPHAGIA, UNSPECIFIED TYPE: ICD-10-CM

## 2022-04-07 DIAGNOSIS — I82.501 CHRONIC DEEP VEIN THROMBOSIS (DVT) OF RIGHT LOWER EXTREMITY, UNSPECIFIED VEIN (HCC): ICD-10-CM

## 2022-04-07 DIAGNOSIS — G30.1 LATE ONSET ALZHEIMER'S DISEASE WITHOUT BEHAVIORAL DISTURBANCE (HCC): Primary | ICD-10-CM

## 2022-04-07 DIAGNOSIS — M62.838 MUSCLE SPASM: ICD-10-CM

## 2022-04-07 DIAGNOSIS — F45.8 BRUXISM (TEETH GRINDING): ICD-10-CM

## 2022-04-07 DIAGNOSIS — F02.80 LATE ONSET ALZHEIMER'S DISEASE WITHOUT BEHAVIORAL DISTURBANCE (HCC): Primary | ICD-10-CM

## 2022-04-07 PROCEDURE — 99309 SBSQ NF CARE MODERATE MDM 30: CPT | Performed by: NURSE PRACTITIONER

## 2022-04-07 NOTE — PROGRESS NOTES
[address], [address] [place] [address], [address] [place] [address]  [phone]    NAME: Rian Good [facility]  AGE: 80 y o  SEX: female    Progress Note    Location:   POS: 28 (LTC)    Assessment/Plan:    Late onset Alzheimer's disease without behavioral disturbance ([address] )  Significant cognitive impairment  Per nursing, noted sudden decline in cognition/appetite/mood/behavior on the last 2-3 months  Noted constant teeth grinding on this visit  Patient did not actively participate nor able to engage in this visit  Weight loss: approx 9 0 lbs in 3 months  Continue LTCF supportive care  Continue to monitor/assess for pain every shift   Continue full staff assistance in all meals  Continue Ensure BID  Continue monthly weight check    Chronic deep vein thrombosis (DVT) ([facility], [facility])  Continue Xarelto 15mg daily    Periodontal disease  Poor dentition with multiple dental decay noted on front teeth  No lymphadenopathy palpated  Constant teeth grinding on this visit  Per nursing, difficult to do oral hygiene as patient declined to open mouth during care  Continue to attempt oral hygiene    Muscle spasm  Continue Baclofen 10mg BID    Dysphagia  Patient currently on full assistance during meals  Declining ave meal completion on the last 2 months per nursing report  = previously able to complete 50-75% per meal  = now changed to 25% and sometimes declined meals  Per nursing, daughter is aware in change in meal completions - visits very often and brings food from home and feeds patient  RD following    Bruxism (teeth grinding)  Likely multifactorial: possible TMJ discomfort or disorder, cervicogenic pain, psychological factors such as anxiety, depression  Constant teeth grinding on this visit  = patient was watching TV and no grinding of teeth but as soon as providers started interacting with her - the grinding started    Per nursing, increased frequency on the last 1-2 months  Unable to redirect nor patient respond to request stop teeth grinding on this visit  Did not actively participate on assessment - did not open mouth for assessment  Multiple dental decay seen on front teeth  Consult Dental office to rule out Temporomandibular disorders - if family is agreeable  Continue Baclofen 10mg BID  Start Tylenol 650mg Q8 hours + PRN Tylenol 650mg Q6 hours (Max of 3G per day) -  if family is agreeable  Chief complaint / Reason for visit: Follow-up visit    History of Present Illness: This is an 80year old female patient admitted at [facility] for Dementia  Patient is seen and examined today to follow-up acute medical conditions initially managed by another provider on 3/22/2022: Bruxism, declining meal completion/ appetite  Patient is OOB sitting in jenny-chair in room watching TV  Patient is alert and non verbal - per nursing, this is baseline for patient  Patient maintained very good eye contact but does not seem to responds to any type stimulation and encouragement for engagement on this visit - likely cognitive impairment versus not responding to unfamiliar face/staff  Patient is also observed to constantly grind her teeth - iniitally no teeth grinding but as soon as this provider started interacting with her, the teeth grinding started and unable to redirect nor respond to request to stop  Patient did not actively participated on this visit - limited assessment nor able to assess for ROS  Called and spoke with daughter - updated on reason for visit as well as findings of the visit  Discussed about the on-going bruxism and recommendations including starting patient on Tylenol 650mg Q8 hours + PRN if needed, possible referral to Dental office for further evaluation of bruxism including TMJ and any related disorder - possible dental evaluation and hygiene as well - daughter agreable to starting Tylenol but will discuss first with family about the dental consult   Discussed with daughter that patient may need sedation in order to be evaluated by Dental office  Questions answered  Per daughter, goal is to keep patient comfortable  Review of Systems:  Per history of present illness, all other systems reviewed and negative  HISTORY:  Medical Hx: Reviewed, unchanged  Family Hx: Reviewed, unchanged  Soc Hx: Reviewed,  unchanged    ALLERGY: Reviewed, unchanged  No Known Allergies     PHYSICAL EXAM:  Vital Signs: T98 3F -P89 -R16 BP: 128/64 SpO2: 96% RA  Weight: 160 0 lbs (4/6/2022) <= 164 0 lbs (3/22/2022) <= 169 0 lbs (1/26/2022)    General: NAD  Head: Atraumatic  Normocephalic  Eye Exam: anicteric sclera, no discharge, PERRLA, No injection  wears glasses  Oral Exam: moist mucous membranes,   Poor dentition - multiple dental decay - limited assessment as patient declined to open mouth  Bruxism  Neck Exam: no anterior cervical lymphadenopathy noted, neck supple  Cardiovascular: regular rate, irregular rhythm, no murmurs, rubs, or gallops  Pulmonary: no wheeze, no rhonchi, no rales  No chest tenderness  Limited assessment as patient did not actively participated on this visit - base don regular breathing patterns only  Abdominal: soft, non-tender, nondistended, bowel sounds audible x 4 quadrants  : Non distended bladder  Extremities and skin: trace B/L LE edema , no rashes  Intact skin  Neurological: alert, non ambulatory  Laboratory results / Imaging reviewed: Hard copy/ies in medical chart  Current Medications:   All medications reviewed and updated in Nursing Home Chart    CURRENT MEDICATION LIST IN -LTCF:    Current Outpatient Medications:     acetaminophen (TYLENOL) 325 mg tablet, Take 650 mg by mouth every 6 (six) hours as needed for mild pain, Disp: , Rfl:     aluminum-magnesium hydroxide-simethicone (MAALOX) 200-200-20 MG/5ML SUSP, Take 30 mL by mouth every 6 (six) hours as needed for heartburn, Disp: , Rfl:     baclofen 10 mg tablet, Take 10 mg by mouth 2 (two) times a day, Disp: , Rfl:     bisacodyl (Dulcolax) 10 mg suppository, Insert 10 mg into the rectum every third day as needed for constipation, Disp: , Rfl:     levothyroxine 50 mcg tablet, Take 50 mcg by mouth daily, Disp: , Rfl:     Magnesium Hydroxide (MILK OF MAGNESIA PO), Take 30 mL by mouth every 6 (six) hours as needed, Disp: , Rfl:     polyethylene glycol (MIRALAX) 17 g packet, Take 17 g by mouth daily as needed (constipation), Disp: , Rfl:     rivaroxaban (Xarelto) 15 mg tablet, Take 15 mg by mouth daily with breakfast, Disp: , Rfl:     senna-docusate sodium (SENOKOT S) 8 6-50 mg per tablet, Take 2 tablets by mouth daily, Disp: , Rfl:     Sodium Phosphates (FLEET ENEMA RE), Insert 1 suppository into the rectum every third day as needed, Disp: , Rfl:       Please note:  Voice-recognition software may have been used in the preparation of this document  Occasional wrong word or "sound-alike" substitutions may have occurred due to the inherent limitations of voice recognition software  Interpretation should be guided by NITHIN Laws  4/8/2022

## 2022-04-08 NOTE — ASSESSMENT & PLAN NOTE
Significant cognitive impairment  Per nursing, noted sudden decline in cognition/appetite/mood/behavior on the last 2-3 months  Noted constant teeth grinding on this visit  Patient did not actively participate nor able to engage in this visit    Weight loss: approx 9 0 lbs in 3 months  Continue LTCF supportive care  Continue to monitor/assess for pain every shift   Continue full staff assistance in all meals  Continue Ensure BID  Continue monthly weight check

## 2022-04-08 NOTE — ASSESSMENT & PLAN NOTE
Patient currently on full assistance during meals  Declining ave meal completion on the last 2 months per nursing report  = previously able to complete 50-75% per meal  = now changed to 25% and sometimes declined meals  Per nursing, daughter is aware in change in meal completions - visits very often and brings food from home and feeds patient    RD following

## 2022-04-08 NOTE — ASSESSMENT & PLAN NOTE
Likely multifactorial: possible TMJ discomfort or disorder, cervicogenic pain, psychological factors such as anxiety, depression  Constant teeth grinding on this visit  = patient was watching TV and no grinding of teeth but as soon as providers started interacting with her - the grinding started  Per nursing, increased frequency on the last 1-2 months  Unable to redirect nor patient respond to request stop teeth grinding on this visit  Did not actively participate on assessment - did not open mouth for assessment  Multiple dental decay seen on front teeth  Consult Dental office to rule out Temporomandibular disorders - if family is agreeable  Continue Baclofen 10mg BID  Start Tylenol 650mg Q8 hours + PRN Tylenol 650mg Q6 hours (Max of 3G per day) -  if family is agreeable

## 2022-04-08 NOTE — ASSESSMENT & PLAN NOTE
Poor dentition with multiple dental decay noted on front teeth  No lymphadenopathy palpated  Constant teeth grinding on this visit  Per nursing, difficult to do oral hygiene as patient declined to open mouth during care    Continue to attempt oral hygiene

## 2022-04-12 ENCOUNTER — NURSING HOME VISIT (OUTPATIENT)
Dept: FAMILY MEDICINE CLINIC | Facility: CLINIC | Age: 87
End: 2022-04-12

## 2022-04-12 DIAGNOSIS — I82.501 CHRONIC DEEP VEIN THROMBOSIS (DVT) OF RIGHT LOWER EXTREMITY, UNSPECIFIED VEIN (HCC): ICD-10-CM

## 2022-04-12 DIAGNOSIS — R13.10 DYSPHAGIA, UNSPECIFIED TYPE: ICD-10-CM

## 2022-04-12 DIAGNOSIS — E03.9 HYPOTHYROIDISM, UNSPECIFIED TYPE: Primary | ICD-10-CM

## 2022-04-12 DIAGNOSIS — F45.8 BRUXISM (TEETH GRINDING): ICD-10-CM

## 2022-04-14 NOTE — ASSESSMENT & PLAN NOTE
At baseline, need full assistance with food  Pateint's daughter reports new onset of bruxism   Per note there's a declining in meal ingestion to 25% and often refusing to complete her meals  Aspiration precaution   Continue assistance with meal  Speech therapy with swallowing study to assess grade of dysphagia if necessary

## 2022-04-14 NOTE — PROGRESS NOTES
[address]  [phone]   [facility] of Service: nursing home place of service: POS 32 Unskilled- No Part A Coverage      NAME: Hank Morales  AGE: 80 y o  SEX: female [phone]    DATE OF ENCOUNTER: 5/24/2022    Assessment and Plan     Dysphagia  At baseline, need full assistance with food  Pateint's daughter reports new onset of bruxism   Per note there's a declining in meal ingestion to 25% and often refusing to complete her meals  Aspiration precaution   Continue assistance with meal  Speech therapy with swallowing study to assess grade of dysphagia if necessary  Hypothyroidism  Controlled, on levothyroxine 50 mcg     Continue    Chronic deep vein thrombosis (DVT) (formerly [facility])  Currently controlled, on Xarelto 15 mg     Continue     Late onset Alzheimer's disease without behavioral disturbance ([facility] )  Patient disoriented with significant cognitive impairment  Reports by nurse teeth grinding  Unable to hear the teeth grinding at this visit   Continue with meal assistance  Continue ensure bid  Continue to monitor weight   Continue supportive care specially with ADL's  Continue redirection as needed  Chief Complaint     Follow up No chief complaint on file  History of Present Illness     Srini Montes is a 80 y o  female with a PMHx of Alzheimer's disease late stage, hypothyroidism, chronic DVT, bowel and bladder incontinence who was seen today for follow up  Her daughter reports that the patient has a new onset of bruxism, and that the grinding of her teeth can be easily heard  The patient was to referred to dentistry  The nurse also reports that has been difficult to feed her due to difficulty opening her mouth  No new labs has been reported  Other wise the patient is resting comfortably in her wheelchair   No other issues reported since last visit    The following portions of the patient's history were reviewed and updated as appropriate: allergies, current medications, past family history, past medical history, past social history, past surgical history and problem list     Review of Systems     Review of Systems   Unable to perform ROS: Dementia       Active Problem List     Patient Active Problem List   Diagnosis    Debility    Late onset Alzheimer's disease without behavioral disturbance ([facility] )    Hypothyroidism    Chronic deep vein thrombosis (DVT) ([facility] )    Periodontal disease    Dysphagia    Muscle spasm    Bowel and bladder incontinence    Other constipation    Poor appetite    Bruxism (teeth grinding)       Objective     Vital Signs:     Blood pressure 120/64 mmHg Heart Rate: 89 Respiratory Rate 18   Temperature 98 7 F Oxygen Saturation 95% Weight 160 lbs    Physical Exam  Vitals and nursing note reviewed  Constitutional:       General: She is awake  She is not in acute distress  Appearance: Normal appearance  She is well-developed and well-groomed  She is obese  She is not ill-appearing, toxic-appearing or diaphoretic  Comments: Resting comfortably in her wheelchair  HENT:      Head: Normocephalic and atraumatic  Nose: Nose normal       Mouth/Throat:      Mouth: Mucous membranes are moist       Pharynx: Oropharynx is clear  No oropharyngeal exudate or posterior oropharyngeal erythema  Eyes:      Extraocular Movements: Extraocular movements intact  Conjunctiva/sclera: Conjunctivae normal       Pupils: Pupils are equal, round, and reactive to light  Cardiovascular:      Rate and Rhythm: Normal rate and regular rhythm  No extrasystoles are present  Pulses: Normal pulses  Radial pulses are 2+ on the right side and 2+ on the left side  Heart sounds: Normal heart sounds, S1 normal and S2 normal    Pulmonary:      Effort: Pulmonary effort is normal       Breath sounds: Normal breath sounds and air entry     Abdominal:      General: Bowel sounds are normal  Palpations: Abdomen is soft  There is no mass  Tenderness: There is no abdominal tenderness  There is no right CVA tenderness, left CVA tenderness or guarding  Musculoskeletal:         General: Normal range of motion  Cervical back: Normal range of motion  Right lower leg: No edema  Left lower leg: No edema  Comments: Wheelchair bound  Skin:     General: Skin is warm  Capillary Refill: Capillary refill takes less than 2 seconds  Coloration: Skin is not jaundiced or pale  Findings: No bruising, erythema, lesion or rash  Neurological:      General: No focal deficit present  Mental Status: She is alert  Mental status is at baseline  She is disoriented  Psychiatric:         Behavior: Behavior is cooperative  Pertinent Laboratory/Diagnostic Studies:  Laboratory and Imaging studies reviewed  Full report in the paper chart  Current Medications   Medications reviewed and updated in facility chart      Name: Gama Mcghee  : 1932  MRN: [id number]        Natasha Valencia MD  Geriatric Medicine  Chucho Alonso MD  PGY-2 [facility]  2022 4:00PM

## 2022-04-14 NOTE — ASSESSMENT & PLAN NOTE
Patient disoriented with significant cognitive impairment  Reports by nurse teeth grinding  Unable to hear the teeth grinding at this visit   Continue with meal assistance  Continue ensure bid  Continue to monitor weight   Continue supportive care specially with ADL's  Continue redirection as needed

## 2022-05-17 ENCOUNTER — NURSING HOME VISIT (OUTPATIENT)
Dept: GERIATRICS | Facility: OTHER | Age: 87
End: 2022-05-17
Payer: MEDICARE

## 2022-05-17 DIAGNOSIS — E03.9 HYPOTHYROIDISM, UNSPECIFIED TYPE: ICD-10-CM

## 2022-05-17 DIAGNOSIS — F45.8 BRUXISM (TEETH GRINDING): ICD-10-CM

## 2022-05-17 DIAGNOSIS — K59.09 OTHER CONSTIPATION: ICD-10-CM

## 2022-05-17 DIAGNOSIS — F02.80 LATE ONSET ALZHEIMER'S DISEASE WITHOUT BEHAVIORAL DISTURBANCE (HCC): Primary | ICD-10-CM

## 2022-05-17 DIAGNOSIS — R13.10 DYSPHAGIA, UNSPECIFIED TYPE: ICD-10-CM

## 2022-05-17 DIAGNOSIS — I82.501 CHRONIC DEEP VEIN THROMBOSIS (DVT) OF RIGHT LOWER EXTREMITY, UNSPECIFIED VEIN (HCC): ICD-10-CM

## 2022-05-17 DIAGNOSIS — G30.1 LATE ONSET ALZHEIMER'S DISEASE WITHOUT BEHAVIORAL DISTURBANCE (HCC): Primary | ICD-10-CM

## 2022-05-17 PROCEDURE — 99309 SBSQ NF CARE MODERATE MDM 30: CPT | Performed by: FAMILY MEDICINE

## 2022-05-17 NOTE — PROGRESS NOTES
[facility]  [address]  [phone]  [address] of Service: nursing home place of service: POS 32 Unskilled- No Part A Coverage      NAME: Deb Morales  AGE: 80 y o  SEX: female [id number]    DATE OF ENCOUNTER: 5/17/2022    Assessment and Plan     Bruxism (teeth grinding)  -today not noticeable teeth grinding noticed  -current medications:  Baclofen 10 mg BID  Tylenol 650 mg q 8 hours  -consider reach out to family for potential Dental evaluation if symptoms agreeable if persists    Dysphagia  -aspiration precautions  -continue to provide assistance with meals and encouragement  -can consider in the future speech therapy/ swallow study if warranted     Hypothyroidism  -TSH 2 55/ T4 1 34 on 3/31/2021  -current medication: levothyroxine 50 mcg daily  -f/u labs annually or sooner if clinically warranted    Chronic deep vein thrombosis (DVT) ([facility] )  -current medication: xarelto 15 mg daily  -last BMP on 3/31/2021, no electrolyte abnormalities noted  -f/u labs annually or sooner if clinically warranted    Late onset Alzheimer's disease without behavioral disturbance ([address] )  -needs assistance with meals  -recent weight: 167 8 lbs  -supplementation with Ensure BID  Supportive care with ADL's  -maintain sleep/awake cycle and redirection    Other constipation  -current medication regimen:  miralax q daily PRN  Milk magnesia q daily PRN  Aluminium hydroxide q daily PRN  Senokot q daily            Chief Complaint     Follow up chronic condtions    History of Present Illness     Reielvis Victor is a 80 y o  female who was seen today for follow up on chronic conditions  Patient was seating in chair comfortable  Was alert, and trying to verbalize a few words and able to follow voice with eyes when speaking to her  Had compression stockings in place  Breathing comfortable at room air  Nursing staff reported patient mostly non verbal at baseline        The following portions of the patient's history were reviewed and updated as appropriate: allergies, current medications, past family history, past medical history, past social history, past surgical history and problem list     Review of Systems     Review of Systems   Unable to perform ROS: Dementia       Active Problem List     Patient Active Problem List   Diagnosis    Debility    [facility] without [facility] )    Hypothyroidism    Chronic deep vein thrombosis (DVT) ([facility] )    Periodontal disease    Dysphagia    Muscle spasm    Bowel and bladder incontinence    Other constipation    Poor appetite    Bruxism (teeth grinding)       Objective     Vital Signs:     Blood pressure: 117/70 Heart Rate: 85 Respiratory Rate: 20   Temperature 97 9 Oxygen Saturation: not in chart today Weight 167 8 lbs    Physical Exam  Vitals reviewed  Constitutional:       General: She is not in acute distress  Appearance: She is not ill-appearing, toxic-appearing or diaphoretic  Eyes:      Extraocular Movements: Extraocular movements intact  Cardiovascular:      Rate and Rhythm: Normal rate and regular rhythm  Pulses: Normal pulses  Heart sounds: Normal heart sounds  No murmur heard  Pulmonary:      Effort: Pulmonary effort is normal  No respiratory distress  Breath sounds: Normal breath sounds  No wheezing  Abdominal:      General: Bowel sounds are normal  There is no distension  Palpations: Abdomen is soft  Tenderness: There is no abdominal tenderness  Musculoskeletal:      Comments: Compression stockings in place   Skin:     General: Skin is warm  Findings: No erythema or rash  Neurological:      Mental Status: She is alert  Mental status is at baseline  Psychiatric:      Comments: Alert, follow voice with eyes  Trying to verbalize words         Pertinent Laboratory/Diagnostic Studies:  Laboratory and Imaging studies reviewed   Full report in the paper chart      Current Medications   Medications reviewed and updated in facility chart      Name: Seble Baldwin  : 1932  MRN: [id number]        Meri Green MD  Family Medicine  2022 11:50 AM

## 2022-05-17 NOTE — ASSESSMENT & PLAN NOTE
-needs assistance with meals  -recent weight: 167 8 lbs  -supplementation with Ensure BID  Supportive care with ADL's  -maintain sleep/awake cycle and redirection

## 2022-05-17 NOTE — ASSESSMENT & PLAN NOTE
-current medication regimen:  miralax q daily PRN  Milk magnesia q daily PRN  Aluminium hydroxide q daily PRN  Senokot q daily

## 2022-05-17 NOTE — ASSESSMENT & PLAN NOTE
-current medication: xarelto 15 mg daily  -last BMP on 3/31/2021, no electrolyte abnormalities noted  -f/u labs annually or sooner if clinically warranted

## 2022-05-17 NOTE — ASSESSMENT & PLAN NOTE
-today not noticeable teeth grinding noticed  -current medications:  Baclofen 10 mg BID  Tylenol 650 mg q 8 hours  -consider reach out to family for potential Dental evaluation if symptoms agreeable if persists

## 2022-05-17 NOTE — ASSESSMENT & PLAN NOTE
-aspiration precautions  -continue to provide assistance with meals and encouragement  -can consider in the future speech therapy/ swallow study if warranted

## 2022-05-17 NOTE — ASSESSMENT & PLAN NOTE
-TSH 2 55/ T4 1 34 on 3/31/2021  -current medication: levothyroxine 50 mcg daily  -f/u labs annually or sooner if clinically warranted

## 2022-06-14 ENCOUNTER — NURSING HOME VISIT (OUTPATIENT)
Dept: GERIATRICS | Facility: OTHER | Age: 87
End: 2022-06-14
Payer: MEDICARE

## 2022-06-14 DIAGNOSIS — M62.838 MUSCLE SPASM: ICD-10-CM

## 2022-06-14 DIAGNOSIS — E03.9 HYPOTHYROIDISM, UNSPECIFIED TYPE: ICD-10-CM

## 2022-06-14 DIAGNOSIS — G30.1 LATE ONSET ALZHEIMER'S DISEASE WITHOUT BEHAVIORAL DISTURBANCE (HCC): Primary | ICD-10-CM

## 2022-06-14 DIAGNOSIS — I82.501 CHRONIC DEEP VEIN THROMBOSIS (DVT) OF RIGHT LOWER EXTREMITY, UNSPECIFIED VEIN (HCC): ICD-10-CM

## 2022-06-14 DIAGNOSIS — F02.80 LATE ONSET ALZHEIMER'S DISEASE WITHOUT BEHAVIORAL DISTURBANCE (HCC): Primary | ICD-10-CM

## 2022-06-14 DIAGNOSIS — F45.8 BRUXISM (TEETH GRINDING): ICD-10-CM

## 2022-06-14 PROCEDURE — 99308 SBSQ NF CARE LOW MDM 20: CPT | Performed by: FAMILY MEDICINE

## 2022-06-14 NOTE — ASSESSMENT & PLAN NOTE
· Continue management for chronic conditions  · Continue care for ADL's  · Redirect as needed  · Maintain sleep/wake cycle

## 2022-06-14 NOTE — ASSESSMENT & PLAN NOTE
· Continued bruxism per nursing today not noticeable teeth grinding noticed  · Will increase Gabapentin to 100 mg BID  · Baclofen 10 mg BID  · Tylenol 650 mg q 8 hours

## 2022-06-14 NOTE — ASSESSMENT & PLAN NOTE
· Chronic RLE DVT with minimal varicosities     · Will need lifelong anticoagulation   · CBC 12/21 wnl-no evidence of anemia or thrombocytopenia  · Electrolytes wnl with stable kidney and liver function per CMP 12/2021  · Will recheck CMP next visit   · Continue Xarelto 15 mg qd

## 2022-06-14 NOTE — PROGRESS NOTES
[facility]  [address]  [phone]  [address] of Service: nursing home place of service: POS 32 Unskilled- No Part A Coverage      NAME: Randy Morales  AGE: 80 y o  SEX: female [id number]    DATE OF ENCOUNTER: 6/14/2022    Assessment and Plan   Late onset Alzheimer's disease without behavioral disturbance ([facility] [address] )  · Continue management for chronic conditions  · Continue care for ADL's  · Redirect as needed  · Maintain sleep/wake cycle     Chronic deep vein thrombosis (DVT) (HCC)  · Chronic RLE DVT with minimal varicosities  · Will need lifelong anticoagulation   · CBC 12/21 wnl-no evidence of anemia or thrombocytopenia  · Electrolytes wnl with stable kidney and liver function per CMP 12/2021  · Will recheck CMP next visit   · Continue Xarelto 15 mg qd        Bruxism (teeth grinding)  · Continued bruxism per nursing today not noticeable teeth grinding noticed  · Will increase Gabapentin to 100 mg BID  · Baclofen 10 mg BID  · Tylenol 650 mg q 8 hours      Hypothyroidism  · Chronic, stable  · Continue Levothyroxine 50 mcg    Muscle spasm  · Stable  · Continue Baclofen 10 mg qd     Chief Complaint     Follow up No chief complaint on file  History of Present Illness     Mitchell Petersen is a 80 y o  female PMHx of Late onset Alzheimer's, Hypothyroidism, Chronic DVT, Bowel & Bladder incontinence who was seen today for follow up  who was seen today for follow up  Been a couple of months since I've seen this patient but newly noted bruxism identified the last two visits  She is resting comfortably in her chair watching television       The following portions of the patient's history were reviewed and updated as appropriate: allergies, current medications, past family history, past medical history, past social history, past surgical history and problem list     Review of Systems     Review of Systems   Unable to perform ROS: Dementia     Active Problem List Patient Active Problem List   Diagnosis    Debility    Late onset Alzheimer's disease without behavioral disturbance (HCC)    Hypothyroidism    Chronic deep vein thrombosis (DVT) (HCC)    Periodontal disease    Dysphagia    Muscle spasm    Bowel and bladder incontinence    Other constipation    Poor appetite    Bruxism (teeth grinding)       Objective     Vital Signs:     Blood pressure 120/62 Heart Rate: 77 Respiratory Rate 16   Temperature 97 5 Oxygen Saturation 95% Weight 167 lbs    Physical Exam  Constitutional:       Appearance: She is well-developed  Comments: Resting comfortably in chair, sleeping   HENT:      Head: Normocephalic and atraumatic  Cardiovascular:      Rate and Rhythm: Normal rate and regular rhythm  Heart sounds: Normal heart sounds  No murmur heard  No gallop  Pulmonary:      Effort: Pulmonary effort is normal  No respiratory distress  Breath sounds: Normal breath sounds  No wheezing or rales  Abdominal:      General: Bowel sounds are normal  There is no distension  Palpations: Abdomen is soft  There is no mass  Tenderness: There is no abdominal tenderness  There is no guarding or rebound  Hernia: No hernia is present  Musculoskeletal:      Right lower leg: No edema  Left lower leg: No edema  Skin:     General: Skin is warm  Findings: No erythema or rash  Neurological:      Mental Status: Mental status is at baseline  Pertinent Laboratory/Diagnostic Studies:  Laboratory and Imaging studies reviewed  Full report in the paper chart  Current Medications   Medications reviewed and updated in facility chart      Name: Ada Ball  : 1932  MRN: [id number]    Nabor Vidales MD  Geriatric Medicine  2022 3:48 PM

## 2022-08-10 ENCOUNTER — NURSING HOME VISIT (OUTPATIENT)
Dept: GERIATRICS | Facility: OTHER | Age: 87
End: 2022-08-10
Payer: MEDICARE

## 2022-08-10 DIAGNOSIS — F45.8 BRUXISM (TEETH GRINDING): ICD-10-CM

## 2022-08-10 DIAGNOSIS — E03.9 HYPOTHYROIDISM, UNSPECIFIED TYPE: ICD-10-CM

## 2022-08-10 DIAGNOSIS — G30.1 LATE ONSET ALZHEIMER'S DISEASE WITHOUT BEHAVIORAL DISTURBANCE (HCC): Primary | ICD-10-CM

## 2022-08-10 DIAGNOSIS — F02.80 LATE ONSET ALZHEIMER'S DISEASE WITHOUT BEHAVIORAL DISTURBANCE (HCC): Primary | ICD-10-CM

## 2022-08-10 DIAGNOSIS — R53.81 DEBILITY: ICD-10-CM

## 2022-08-10 DIAGNOSIS — M62.838 MUSCLE SPASM: ICD-10-CM

## 2022-08-10 DIAGNOSIS — R13.10 DYSPHAGIA, UNSPECIFIED TYPE: ICD-10-CM

## 2022-08-10 DIAGNOSIS — I82.501 CHRONIC DEEP VEIN THROMBOSIS (DVT) OF RIGHT LOWER EXTREMITY, UNSPECIFIED VEIN (HCC): ICD-10-CM

## 2022-08-10 DIAGNOSIS — K59.09 OTHER CONSTIPATION: ICD-10-CM

## 2022-08-10 PROCEDURE — 99309 SBSQ NF CARE MODERATE MDM 30: CPT | Performed by: NURSE PRACTITIONER

## 2022-08-10 RX ORDER — GABAPENTIN 100 MG/1
100 CAPSULE ORAL 2 TIMES DAILY
COMMUNITY

## 2022-08-10 NOTE — ASSESSMENT & PLAN NOTE
Multifactorial  Continue 24/7 care and support at long-term care facility for ADLs; IADLs  Continue fall precautions  Skin breakdown prevention measures in place by nursing  Ensure adequate hydration and nutrition

## 2022-08-10 NOTE — PROGRESS NOTES
Facility: [facility]  POS: 32 (LTC)  Progress Note    Chief Complaint/Reason for visit:  LTC follow-up  Code status:  DNR  History obtained from nursing staff and EMR  History of Present Illness:  70-year-old female seen and examined for LTC follow up  Received patient seated in wheelchair with legs elevated  Patient is awake, confused, and disoriented  Continuous teeth grinding noted during visit  Nursing reports that patient is stable  Patient gained 3 5 lb in 1 month  No nonverbal signs of pain noted  Spoke to patient's daughter Kaila Killian via phone regarding plan of care  Patient's daughter does not want blood work ordered unless patient shows signs of infection  Past Medical History: unchanged from history and physical  Past Medical History:   Diagnosis Date    Alzheimer's dementia ([facility] [address] )     Anemia     Aphasia     Chronic diastolic CHF (congestive heart failure) ([facility]     Depression     DVT (deep venous thrombosis) ([facility]     Hyperlipidemia     Hypothyroidism     OA (osteoarthritis)     Osteoporosis      Family History: unchanged from history and physical  Social History: unchanged from history and physical  Resident Since:  08/07/2013  Review of systems: Review of Systems   Unable to perform ROS: Dementia     Medications: All medication and routine orders were reviewed and updated  Allergies: NKDA  Consults reviewed: Other  Labs/Diagnostics (reviewed by this provider): Copy in Chart    Imaging Reviewed:    Physical Exam  All vital signs were reviewed  Vital signs are done monthly  Weight:  Temp:           BP:  Pulse:   Resp:       O2 Sat:  Constitutional: Normocephalic  Orientation:  Confused and disoriented    Physical Exam  Vitals and nursing note reviewed  Constitutional:       General: She is not in acute distress  Appearance: She is not toxic-appearing or diaphoretic  Comments: Elderly female who appears with chronic illness  HENT:      Head: Normocephalic        Nose: No congestion or rhinorrhea  Mouth/Throat:      Pharynx: No oropharyngeal exudate  Comments: Continuous bruxism  Resisted oral exam   Eyes:      General: No scleral icterus  Right eye: No discharge  Left eye: No discharge  Conjunctiva/sclera: Conjunctivae normal       Pupils: Pupils are equal, round, and reactive to light  Cardiovascular:      Rate and Rhythm: Normal rate  Pulses: Normal pulses  Pulmonary:      Effort: Pulmonary effort is normal  No respiratory distress  Abdominal:      General: Bowel sounds are normal  There is no distension  Palpations: Abdomen is soft  Tenderness: There is no abdominal tenderness  There is no guarding  Musculoskeletal:      Cervical back: Neck supple  No rigidity  Right lower leg: No edema  Left lower leg: No edema  Comments: Patient stiffened her upper extremities during exam    Lymphadenopathy:      Cervical: No cervical adenopathy  Skin:     General: Skin is warm and dry  Capillary Refill: Capillary refill takes less than 2 seconds  Neurological:      Mental Status: She is alert  Mental status is at baseline  Comments: Advanced dementia       Assessment/Plan:  80-year-old female with:    [facility]  Patient with advanced dementia requiring complete assist with all ADLs  No behavior issues reported  Carmenza Holley for transfers  Monitor for pain and ensure that pain is adequately controlled    Muscle spasm  Stable on baclofen 10 mg daily    Bruxism (teeth grinding)  Continued bruxism   Continue gabapentin 100 mg b i d  Continue Tylenol 650 mg q 8 hours  Continue baclofen 10 mg b i d      Hypothyroidism  Continue levothyroxine 50 mcg daily    Dysphagia  Requires feeding with all meals  Continue dysphagia 1 diet  Continued aspiration precautions    Chronic deep vein thrombosis (DVT) (HCC)  Continue Xarelto 15 mg indefinitely    Other constipation  Controlled with senna S  Continue Dulcolax suppository p r n  Continue MiraLax p r n  Debility  Multifactorial  Continue 24/7 care and support at long-term care facility for ADLs; IADLs  Continue fall precautions  Skin breakdown prevention measures in place by nursing  Ensure adequate hydration and nutrition    This note was completed in part utilizing [employer] fluency direct voice recognition software  Grammatical errors, random word insertion, spelling mistakes, and incomplete sentences may be an occasional consequence of the system secondary to software limitations, ambient noise and hardware issues  At the time of dictation, efforts were made to edit, clarify and/or correct errors  Please read the chart carefully and recognize, using context, where substitutions have occurred  If you have any questions or concerns about the context, text or information contained within the body of this dictation, please contact myself, the provider, for further clarification      Pooja Ch  0/28/04656:53 PM

## 2022-08-10 NOTE — ASSESSMENT & PLAN NOTE
Patient with advanced dementia requiring complete assist with all ADLs  No behavior issues reported  Michela Meléndez for transfers  Monitor for pain and ensure that pain is adequately controlled

## 2022-08-10 NOTE — ASSESSMENT & PLAN NOTE
Continued bruxism   Continue gabapentin 100 mg b i d  Continue Tylenol 650 mg q 8 hours  Continue baclofen 10 mg b i d

## 2022-09-09 ENCOUNTER — NURSING HOME VISIT (OUTPATIENT)
Dept: GERIATRICS | Facility: OTHER | Age: 87
End: 2022-09-09
Payer: MEDICARE

## 2022-09-09 DIAGNOSIS — F02.80 LATE ONSET ALZHEIMER'S DISEASE WITHOUT BEHAVIORAL DISTURBANCE (HCC): Primary | ICD-10-CM

## 2022-09-09 DIAGNOSIS — I82.501 CHRONIC DEEP VEIN THROMBOSIS (DVT) OF RIGHT LOWER EXTREMITY, UNSPECIFIED VEIN (HCC): ICD-10-CM

## 2022-09-09 DIAGNOSIS — G30.1 LATE ONSET ALZHEIMER'S DISEASE WITHOUT BEHAVIORAL DISTURBANCE (HCC): Primary | ICD-10-CM

## 2022-09-09 DIAGNOSIS — E03.9 HYPOTHYROIDISM, UNSPECIFIED TYPE: ICD-10-CM

## 2022-09-09 PROCEDURE — 99309 SBSQ NF CARE MODERATE MDM 30: CPT | Performed by: FAMILY MEDICINE

## 2022-09-09 NOTE — PROGRESS NOTES
[facility]   [address] [facility], [place], [address]  Progress Note  POS: Nursing Facility/LTC-32    Unable to obtain from patient due to: Dementia; history obtained speaking with nursing along with medical record review  Chief Complaint/Reason for visit: Follow up of chronic medical conditions  History of Present Illness: 80year old female evaluated for routine follow up of chronic medical conditions  Weight stable with 1lb weight gain over past month; continues on modified diet in setting of dysphagia and total feeding assistance  Past Medical History: Reviewed and unchanged  Review of systems: Review of Systems   Unable to perform ROS: Dementia     Medications: No changes made  Allergies: NKDA    Physical Exam    Vitals reviewed in SNF EMR    Physical Exam  Vitals and nursing note reviewed  Constitutional:       General: She is awake  She is not in acute distress  Appearance: She is well-developed and well-groomed  She is not toxic-appearing or diaphoretic  HENT:      Head: Normocephalic and atraumatic  Right Ear: External ear normal       Left Ear: External ear normal       Nose: No rhinorrhea  Mouth/Throat:      Mouth: Mucous membranes are moist    Eyes:      General: No scleral icterus  Right eye: No discharge  Left eye: No discharge  Conjunctiva/sclera: Conjunctivae normal    Cardiovascular:      Rate and Rhythm: Normal rate and regular rhythm  Heart sounds: S1 normal and S2 normal    Pulmonary:      Effort: Pulmonary effort is normal  No respiratory distress  Breath sounds: No wheezing  Abdominal:      General: There is no distension  Palpations: Abdomen is soft  Musculoskeletal:      Cervical back: No rigidity  Right lower leg: No edema  Left lower leg: No edema  Skin:     General: Skin is warm and dry  Coloration: Skin is not jaundiced or pale  Neurological:      Mental Status: She is alert  Mental status is at baseline  Motor: Abnormal muscle tone (L>R UE) present  No tremor  Psychiatric:         Speech: She is noncommunicative  Behavior: Behavior is slowed  Behavior is cooperative         Assessment/Plan:  80year old female with:    [facility]  Continue LTCF for 24/7 and ADL care including total assistance for feeding  Management of chronic medical conditions as outlined     Chronic deep vein thrombosis (DVT) ([facility] )  Continue anticoagulation with xarelto; indefinitely in setting of immobility  Yearly CBC and CMP to ensure stable blood counts and renal function- due approx Nov 2022    Hypothyroidism  Continue levothyroxine  Check TSH and free T4 with labs as above- approx Nov 2022    [address] [facility], DO  9/9/22

## 2022-10-16 NOTE — ASSESSMENT & PLAN NOTE
Continue anticoagulation with xarelto; indefinitely in setting of immobility  Yearly CBC and CMP to ensure stable blood counts and renal function- due approx Nov 2022

## 2022-10-16 NOTE — ASSESSMENT & PLAN NOTE
Continue LTCF for 24/7 and ADL care including total assistance for feeding  Management of chronic medical conditions as outlined

## 2022-10-25 ENCOUNTER — NURSING HOME VISIT (OUTPATIENT)
Dept: GERIATRICS | Facility: OTHER | Age: 87
End: 2022-10-25
Payer: MEDICARE

## 2022-10-25 DIAGNOSIS — R13.10 DYSPHAGIA, UNSPECIFIED TYPE: ICD-10-CM

## 2022-10-25 DIAGNOSIS — E03.9 HYPOTHYROIDISM, UNSPECIFIED TYPE: ICD-10-CM

## 2022-10-25 DIAGNOSIS — K59.09 OTHER CONSTIPATION: ICD-10-CM

## 2022-10-25 DIAGNOSIS — F45.8 BRUXISM (TEETH GRINDING): ICD-10-CM

## 2022-10-25 DIAGNOSIS — G30.1 LATE ONSET ALZHEIMER'S DISEASE WITHOUT BEHAVIORAL DISTURBANCE (HCC): Primary | ICD-10-CM

## 2022-10-25 DIAGNOSIS — R53.81 DEBILITY: ICD-10-CM

## 2022-10-25 DIAGNOSIS — F02.80 LATE ONSET ALZHEIMER'S DISEASE WITHOUT BEHAVIORAL DISTURBANCE (HCC): Primary | ICD-10-CM

## 2022-10-25 PROCEDURE — 99309 SBSQ NF CARE MODERATE MDM 30: CPT | Performed by: NURSE PRACTITIONER

## 2022-10-25 NOTE — ASSESSMENT & PLAN NOTE
No reported bruxism and none noted during examination today  Continue gabapentin  Continue scheduled Tylenol  Continue baclofen 10 mg b i d

## 2022-10-25 NOTE — ASSESSMENT & PLAN NOTE
Continue dysphagia 1 diet  Patient gain 1 lb from August to September    No weight noted in October  Continue aspiration precautions

## 2022-10-25 NOTE — ASSESSMENT & PLAN NOTE
Multifactorial  Continue supportive care at long-term care facility  Ensure adequate hydration and nutrition  Skin breakdown prevention measures in place by nursing staff  Management of acute and chronic medical conditions

## 2022-10-25 NOTE — PROGRESS NOTES
Facility: [facility]  POS: 32 (LTC)  Progress Note    Chief Complaint/Reason for visit: LTC follow up visit  Code status:  DNR  History of Present Illness:  51-year-old female seen and examined for LTC follow up of acute and chronic medical conditions  At time of examination, patient is seated in chair, and appears in no distress  Patient had her eyes closed and was not cooperative with exam   She did not open her mouth for oral exam   No acute issues reported by nursing staff  Past Medical History: unchanged from history and physical  Past Medical History:   Diagnosis Date   • Alzheimer's dementia ([facility] [address] )    • Anemia    • Aphasia    • Chronic diastolic CHF (congestive heart failure) ([facility])    • Depression    • DVT (deep venous thrombosis) ([facility])    • Hyperlipidemia    • Hypothyroidism    • OA (osteoarthritis)    • Osteoporosis      Family History: unchanged from history and physical  Social History: unchanged from history and physical  Review of systems: Review of Systems   Unable to perform ROS: Dementia     Medications: All medication and routine orders were reviewed and updated  Allergies: NKDA  Consults reviewed: Other  Labs/Diagnostics (reviewed by this provider): Copy in Chart paper chart    Imaging Reviewed:  None today    Physical Exam  All vital signs were reviewed in patient's electronic medical record; stable  Weight:  Temp:           BP:  Pulse: Resp: 16      O2 Sat:  Constitutional: Normocephalic  Orientation:  Confused and disoriented     Physical Exam  Vitals and nursing note reviewed  Constitutional:       General: She is not in acute distress  Appearance: She is not toxic-appearing or diaphoretic  Comments: Elderly female who appears chronically ill  In no distress  HENT:      Head: Normocephalic  Nose: No congestion or rhinorrhea  Mouth/Throat:      Mouth: Mucous membranes are moist       Pharynx: No oropharyngeal exudate  Eyes:      General: No scleral icterus  Right eye: No discharge  Left eye: No discharge  Extraocular Movements: Extraocular movements intact  Conjunctiva/sclera: Conjunctivae normal       Pupils: Pupils are equal, round, and reactive to light  Cardiovascular:      Rate and Rhythm: Normal rate and regular rhythm  Pulses: Normal pulses  Pulmonary:      Effort: Pulmonary effort is normal  No respiratory distress  Breath sounds: Normal breath sounds  No wheezing, rhonchi or rales  Abdominal:      General: Bowel sounds are normal  There is no distension  Palpations: Abdomen is soft  Tenderness: There is no abdominal tenderness  There is no guarding  Musculoskeletal:      Cervical back: Neck supple  No rigidity  Right lower leg: No edema  Left lower leg: No edema  Comments: Moves all 4 extremities  Lymphadenopathy:      Cervical: No cervical adenopathy  Skin:     General: Skin is warm and dry  Capillary Refill: Capillary refill takes less than 2 seconds  Neurological:      Mental Status: She is alert  Mental status is at baseline  Psychiatric:         Mood and Affect: Mood normal          Behavior: Behavior normal          Thought Content: Thought content normal        Assessment/Plan:  26-year-old female with:    Late onset Alzheimer's disease without behavioral disturbance ([facility] [address] )  Continue 24/7 care and support at long-term care facility for ADLs; IADLs  Patient with progressive dementia and requires complete assist with all ADLs  Maintain sleep/wake cycle  Continue delirium precautions  Monitor for pain and ensure that pain is adequately controlled  Avoid deliriogenic medications    Bruxism (teeth grinding)  No reported bruxism and none noted during examination today  Continue gabapentin  Continue scheduled Tylenol  Continue baclofen 10 mg b i d  Dysphagia  Continue dysphagia 1 diet  Patient gain 1 lb from August to September    No weight noted in October  Continue aspiration precautions    Hypothyroidism  Continue levothyroxine 50 mcg daily    Other constipation  Continue senna S two p o  daily  Continue Dulcolax suppository and MiraLax p r n  Debility  Multifactorial  Continue supportive care at long-term care facility  Ensure adequate hydration and nutrition  Skin breakdown prevention measures in place by nursing staff  Management of acute and chronic medical conditions    This note was completed in part utilizing [employer] direct voice recognition software  Grammatical errors, random word insertion, spelling mistakes, and incomplete sentences may be an occasional consequence of the system secondary to software limitations, ambient noise and hardware issues  At the time of dictation, efforts were made to edit, clarify and/or correct errors  Please read the chart carefully and recognize, using context, where substitutions have occurred  If you have any questions or concerns about the context, text or information contained within the body of this dictation, please contact myself, the provider, for further clarification      Bernardo Doe  10/25/20198:04 PM

## 2022-10-25 NOTE — ASSESSMENT & PLAN NOTE
Continue 24/7 care and support at long-term care facility for ADLs; IADLs  Patient with progressive dementia and requires complete assist with all ADLs  Maintain sleep/wake cycle  Continue delirium precautions  Monitor for pain and ensure that pain is adequately controlled  Avoid deliriogenic medications

## 2022-10-30 NOTE — ASSESSMENT & PLAN NOTE
Chronic RLE DVT  Continue xarelto; at high risk for recurrent thrombotic event in setting of immobility
Continue levothyroxine  TSH, Free T4 WNL; recheck in one year or sooner if continued decline in weight
Continue modified diet  Aspiration precautions  Continue to monitor weights monthly
Continue supportive care  Continue LTCF for 24/7 and ADL care including assist for feeding  Management of chronic medical conditions as outlined
.

## 2022-12-09 ENCOUNTER — NURSING HOME VISIT (OUTPATIENT)
Dept: GERIATRICS | Facility: OTHER | Age: 87
End: 2022-12-09

## 2022-12-09 DIAGNOSIS — R32 BOWEL AND BLADDER INCONTINENCE: ICD-10-CM

## 2022-12-09 DIAGNOSIS — I82.501 CHRONIC DEEP VEIN THROMBOSIS (DVT) OF RIGHT LOWER EXTREMITY, UNSPECIFIED VEIN (HCC): ICD-10-CM

## 2022-12-09 DIAGNOSIS — R53.81 DEBILITY: ICD-10-CM

## 2022-12-09 DIAGNOSIS — E03.9 HYPOTHYROIDISM, UNSPECIFIED TYPE: ICD-10-CM

## 2022-12-09 DIAGNOSIS — R15.9 BOWEL AND BLADDER INCONTINENCE: ICD-10-CM

## 2022-12-09 DIAGNOSIS — R13.10 DYSPHAGIA, UNSPECIFIED TYPE: ICD-10-CM

## 2022-12-09 DIAGNOSIS — G30.1 LATE ONSET ALZHEIMER'S DISEASE WITHOUT BEHAVIORAL DISTURBANCE (HCC): Primary | ICD-10-CM

## 2022-12-09 DIAGNOSIS — F02.80 LATE ONSET ALZHEIMER'S DISEASE WITHOUT BEHAVIORAL DISTURBANCE (HCC): Primary | ICD-10-CM

## 2022-12-18 NOTE — ASSESSMENT & PLAN NOTE
With progressive decline; recent inability to administer medications at times; patient previously on hospice care- recommend re-evaluation to resume if continued decline  Continue LTC for 24/7 and ADL care including total assistance for feeding  Management of chronic medical conditions as outlined

## 2022-12-18 NOTE — PROGRESS NOTES
[facility]   [address] [facility], [place], [address]  Progress Note  POS: Nursing Facility/LTC-32    Unable to obtain from patient due to: Dementia; history obtained speaking with nursing and medical record review  Chief Complaint/Reason for visit: Follow up of chronic medical conditions  History of Present Illness: 80year old female evaluated for routine follow up of chronic medical conditions  Per nursing note review, unable to give medications in the evening earlier this week, patient not opening mouth for administration  Past Medical History: Reviewed and unchanged  Review of systems: Review of Systems   Unable to perform ROS: Dementia     Medications: No changes made  Allergies: NKDA    Physical Exam    Weight: 165 8lb Temp:98 1F  Physical Exam  Vitals and nursing note reviewed  Constitutional:       General: She is sleeping  She is not in acute distress  Appearance: She is well-developed and well-groomed  She is not toxic-appearing or diaphoretic  HENT:      Head: Normocephalic and atraumatic  Right Ear: External ear normal       Left Ear: External ear normal       Nose: No rhinorrhea  Eyes:      General:         Right eye: No discharge  Left eye: No discharge  Pulmonary:      Effort: Pulmonary effort is normal  No respiratory distress  Abdominal:      General: There is no distension  Musculoskeletal:      Cervical back: No rigidity  Right lower leg: No edema  Left lower leg: No edema  Skin:     Coloration: Skin is not jaundiced or pale  Neurological:      Mental Status: Mental status is at baseline  Cranial Nerves: No facial asymmetry  Motor: Abnormal muscle tone (B/l UE, L>R) present         Assessment/Plan:  80year old female with:    Late onset Alzheimer's disease without behavioral disturbance ([facility] [address] )  With progressive decline; recent inability to administer medications at times; patient previously on hospice care- recommend re-evaluation to resume if continued decline  Continue LTC for 24/7 and ADL care including total assistance for feeding  Management of chronic medical conditions as outlined     Hypothyroidism  Continue levothyroxine  Due for labs- check TSH, free T4    Chronic deep vein thrombosis (DVT) (HCC)  Continue anticoagulation with xarelto; indefinitely in setting of immobility  Yearly CBC and CMP to ensure stable blood counts and renal function- ordered to be drawn 12/20/22    Bowel and bladder incontinence       Debility  Multifactorial  Continue LTC for 24/7 and ADL care  Supportive care, nutritional support  Management of chronic medical conditions as outlined  Fall precautions    Dysphagia  Continue modified diet  Requires full assistance for feeding  Aspiration precautions  Continue to monitor weights monthly- follow up December weight    Carolann Santy, DO  12/9/22

## 2022-12-18 NOTE — ASSESSMENT & PLAN NOTE
Continue anticoagulation with xarelto; indefinitely in setting of immobility  Yearly CBC and CMP to ensure stable blood counts and renal function- ordered to be drawn 12/20/22

## 2022-12-18 NOTE — ASSESSMENT & PLAN NOTE
Continue modified diet  Requires full assistance for feeding  Aspiration precautions  Continue to monitor weights monthly- follow up December weight

## 2023-02-09 ENCOUNTER — NURSING HOME VISIT (OUTPATIENT)
Dept: GERIATRICS | Facility: OTHER | Age: 88
End: 2023-02-09

## 2023-02-09 DIAGNOSIS — E03.9 HYPOTHYROIDISM, UNSPECIFIED TYPE: ICD-10-CM

## 2023-02-09 DIAGNOSIS — R13.10 DYSPHAGIA, UNSPECIFIED TYPE: ICD-10-CM

## 2023-02-09 DIAGNOSIS — F02.80 LATE ONSET ALZHEIMER'S DISEASE WITHOUT BEHAVIORAL DISTURBANCE (HCC): Primary | ICD-10-CM

## 2023-02-09 DIAGNOSIS — R53.81 DEBILITY: ICD-10-CM

## 2023-02-09 DIAGNOSIS — F45.8 BRUXISM (TEETH GRINDING): ICD-10-CM

## 2023-02-09 DIAGNOSIS — G30.1 LATE ONSET ALZHEIMER'S DISEASE WITHOUT BEHAVIORAL DISTURBANCE (HCC): Primary | ICD-10-CM

## 2023-02-09 NOTE — ASSESSMENT & PLAN NOTE
Patient requires 24/7 care and support at LTCF   Patient receptive to her environment, stimuli, activities  Continue delirium precautions  Maintain sleep/wake cycle

## 2023-02-09 NOTE — ASSESSMENT & PLAN NOTE
Multifactorial  Continue 24/7 care and support at long-term care facility for ADLs; IADLs  Skin breakdown prevention measures in place by nursing staff  Ensure adequate hydration and nutrition

## 2023-02-09 NOTE — PROGRESS NOTES
Facility: [facility]  [address] [place] [address], [address]  POS: 32 (LTC)  Progress Note    Chief Complaint/Reason for visit: LTC follow up visit  Code Status: DNR  History obtained from nursing staff and EMR  History of Present Illness: 51-year-old female seen and examined for LTC follow up of chronic medical conditions  Received patient sleeping in bed and appeared in no distress  She woke up to verbal stimuli  She immediately started smiling and speaking  (speech unintelligible ) when I spoke to her regarding the [employer] in her room  Nursing reports that patient has been stable without any acute issues  Continues with bruxism when awake  Patient requires complete assist with ADLs/feeding  Patient's appetite has been stable without any significant weight loss  Staff assists patient to activities provided at facility  Informed patient's daughter Meri Hall of my visit with her mother today and asked her if she had any concerns; no concerns expressed and she stated that she does not want any routine blood work done on her mother  Past Medical History: unchanged from history and physical  Past Medical History:   Diagnosis Date   • Alzheimer's dementia ([facility] [address] )    • Anemia    • Aphasia    • Chronic diastolic CHF (congestive heart failure) ([facility])    • Depression    • DVT (deep venous thrombosis) ([facility])    • Hyperlipidemia    • Hypothyroidism    • OA (osteoarthritis)    • Osteoporosis      Family History: unchanged from history and physical  Social History: unchanged from history and physical  Review of systems: As per review of medical illness, all other systems reviewed and negative  Medications: All medication and routine orders were reviewed and updated  Allergies: Reviewed and unchanged  Consults reviewed: Other  Labs/Diagnostics (reviewed by this provider): Recent routine screening for COVID-19 negative    Imaging Reviewed: None today    Physical Exam  Monthly vital signs; most recent stable  Constitutional: Normocephalic  Orientation: Confused and disoriented    Physical Exam  Vitals and nursing note reviewed  Constitutional:       General: She is not in acute distress  Appearance: She is not toxic-appearing or diaphoretic  Comments: Frail elderly female who appears chronically ill  HENT:      Head: Normocephalic  Nose: No congestion or rhinorrhea  Mouth/Throat:      Pharynx: No oropharyngeal exudate  Comments: Patient did not open her mouth for exam   Continues with bruxism while awake  Eyes:      General:         Right eye: No discharge  Left eye: No discharge  Conjunctiva/sclera: Conjunctivae normal       Pupils: Pupils are equal, round, and reactive to light  Cardiovascular:      Rate and Rhythm: Normal rate  Pulses: Normal pulses  Pulmonary:      Effort: Pulmonary effort is normal  No respiratory distress  Breath sounds: Normal breath sounds  No wheezing, rhonchi or rales  Abdominal:      General: Bowel sounds are normal  There is no distension  Palpations: Abdomen is soft  Tenderness: There is no abdominal tenderness  There is no guarding  Musculoskeletal:      Cervical back: Neck supple  No rigidity  Right lower leg: No edema  Left lower leg: No edema  Comments: Nonambulatory   Lymphadenopathy:      Cervical: No cervical adenopathy  Skin:     General: Skin is warm and dry  Capillary Refill: Capillary refill takes less than 2 seconds  Neurological:      Mental Status: She is alert  Mental status is at baseline     Psychiatric:         Mood and Affect: Mood normal       Comments: Patient was smiling this morning and talking nonstop during visit (speech unintelligible)       Assessment/Plan:  59-year-old female with:    Late onset Alzheimer's disease without behavioral disturbance ([facility] [address] )  Patient requires 24/7 care and support at LTCF   Patient receptive to her environment, stimuli, activities  Continue delirium precautions  Maintain sleep/wake cycle    Bruxism (teeth grinding)  Continue scheduled Tylenol, baclofen, and gabapentin    Hypothyroidism  No routine blood work done as per family wishes  Continue levothyroxine    Dysphagia  Continue dysphagia diet  Requires complete assist for feedings with all meals  Continue aspiration precautions    Debility  Multifactorial  Continue 24/7 care and support at long-term care facility for ADLs; IADLs  Skin breakdown prevention measures in place by nursing staff  Ensure adequate hydration and nutrition    This note was completed in part utilizing [facility] [employer] direct voice recognition software  Grammatical errors, random word insertion, spelling mistakes, and incomplete sentences may be an occasional consequence of the system secondary to software limitations, ambient noise and hardware issues  At the time of dictation, efforts were made to edit, clarify and/or correct errors  Please read the chart carefully and recognize, using context, where substitutions have occurred  If you have any questions or concerns about the context, text or information contained within the body of this dictation, please contact myself, the provider, for further clarification      Alter [address], [address] [facility]  1/0/93842:13 PM

## 2023-02-09 NOTE — ASSESSMENT & PLAN NOTE
Continue dysphagia diet  Requires complete assist for feedings with all meals  Continue aspiration precautions

## 2023-03-27 ENCOUNTER — NURSING HOME VISIT (OUTPATIENT)
Dept: GERIATRICS | Facility: OTHER | Age: 88
End: 2023-03-27

## 2023-03-27 DIAGNOSIS — S99.922A INJURY OF LEFT GREAT TOE, INITIAL ENCOUNTER: ICD-10-CM

## 2023-03-27 DIAGNOSIS — S99.922A INJURY OF TOE ON LEFT FOOT, INITIAL ENCOUNTER: Primary | ICD-10-CM

## 2023-03-27 NOTE — ASSESSMENT & PLAN NOTE
Left third toe nail black in color; suspect injury as site appears to be small hematoma beneath the nail  Nursing to monitor for signs of infection   Consult podiatry

## 2023-03-27 NOTE — PROGRESS NOTES
Facility: [facility]  POS: 32 ([facility])  Acute Med Note  Code status: DNR    Assessment/Plan:  72-year-old female with: Injury of left toe  Left third toe nail black in color; suspect injury as site appears to be small hematoma beneath the nail  Nursing to monitor for signs of infection   Consult podiatry    Injury of left great toe  There is a small red area approximately 0 5 cm tip of left great toe which appears to be a pressure type injury as it is not blanchable  Surrounding skin tissue is slightly red but blanchable  Order Skin-Prep to left great toe twice daily    Subjective: No non-verbal signs of pain noted     Patient ID: Zeferino Larry is a 80 y o  female  72-year-old female seen and examined at the request of nursing staff to evaluate abnormal skin condition of left foot  At time of examination, patient is seated in wheelchair, and appeared in no distress  Patient was pleasant and smiling without nonverbal signs of pain noted  Suspect injury to left great toe and left third toe  Left third toenail  black in color and tip of left great toe with small red area noted  Nursing to continue to monitor sites at this time  The following portions of the patient's history were reviewed and updated as appropriate: Allergies, current medications, Past Family history, past medical history, past social history, past surgical history, and problem list     Review of Systems   Unable to perform ROS: Dementia         Objective:  Most recent vital signs reviewed; no acute findings  Physical Exam  Vitals and nursing note reviewed  Constitutional:       General: She is not in acute distress  Appearance: She is not toxic-appearing or diaphoretic  Comments: Frail elderly female who appears with chronic illness  In no distress  HENT:      Head: Normocephalic  Nose: No congestion or rhinorrhea  Mouth/Throat:      Pharynx: No oropharyngeal exudate     Eyes:      General: Right eye: No discharge  Left eye: No discharge  Conjunctiva/sclera: Conjunctivae normal    Cardiovascular:      Rate and Rhythm: Normal rate  Pulses: Normal pulses  Pulmonary:      Effort: Pulmonary effort is normal  No respiratory distress  Breath sounds: No rhonchi  Musculoskeletal:      Right lower leg: No edema  Left lower leg: No edema  Comments: Moves all 4 extremities  Skin:     General: Skin is warm and dry  Capillary Refill: Capillary refill takes less than 2 seconds  Comments: Left third toe nail black in color  Tip of left great toe with small 0 5 cm red area nonblanchable  Surrounding skin tissue also slightly red but blanchable  Neurological:      Mental Status: She is alert  Mental status is at baseline  Motor: Weakness present  Comments: With known cognitive and memory impairment  Psychiatric:         Mood and Affect: Mood normal            This note was completed in part utilizing with Dragon medical one voice recognition software  Grammatical errors, random word insertion, spelling mistakes, and incomplete sentences may be an occasional consequence of the system secondary to software limitations, ambient noise and hardware issues  At the time of dictation, efforts were made to edit, clarify and/or correct errors  Please read the chart carefully and recognize, using context, where substitutions have occurred  If you have any questions or concerns about the context, text or information contained within the body of this dictation, please contact myself, the provider, for further clarification

## 2023-03-27 NOTE — ASSESSMENT & PLAN NOTE
There is a small red area approximately 0 5 cm tip of left great toe which appears to be a pressure type injury as it is not blanchable    Surrounding skin tissue is slightly red but blanchable  Order Skin-Prep to left great toe twice daily

## 2023-05-10 ENCOUNTER — NURSING HOME VISIT (OUTPATIENT)
Dept: GERIATRICS | Facility: OTHER | Age: 88
End: 2023-05-10

## 2023-05-10 DIAGNOSIS — F02.80 LATE ONSET ALZHEIMER'S DISEASE WITHOUT BEHAVIORAL DISTURBANCE (HCC): Primary | ICD-10-CM

## 2023-05-10 DIAGNOSIS — I82.501 CHRONIC DEEP VEIN THROMBOSIS (DVT) OF RIGHT LOWER EXTREMITY, UNSPECIFIED VEIN (HCC): ICD-10-CM

## 2023-05-10 DIAGNOSIS — G30.1 LATE ONSET ALZHEIMER'S DISEASE WITHOUT BEHAVIORAL DISTURBANCE (HCC): Primary | ICD-10-CM

## 2023-05-10 DIAGNOSIS — E03.9 HYPOTHYROIDISM, UNSPECIFIED TYPE: ICD-10-CM

## 2023-05-10 NOTE — PROGRESS NOTES
[facility]   [address], [address]  Progress Note  POS: Nursing Facility/LTC-32    Unable to obtain from patient due to: Dementia  Chief Complaint/Reason for visit: Follow up of chronic medical conditions   History of Present Illness: 80year old female evaluated for routine follow up of chronic medical conditions  Daughter at bedside  No acute concerns reported per daughter or nursing  Review of systems: Review of Systems   Unable to perform ROS: Dementia     Medications: No changes made  Allergies: NKDA    Physical Exam    Weight: 158 8lb Temp:97 6F BP:112/79 Pulse:76 Resp:18    Physical Exam  Vitals and nursing note reviewed  Constitutional:       General: She is awake  She is not in acute distress  Appearance: She is well-developed and well-groomed  She is not toxic-appearing or diaphoretic  HENT:      Head: Normocephalic and atraumatic  Nose: No rhinorrhea  Mouth/Throat:      Mouth: Mucous membranes are moist    Eyes:      General:         Right eye: No discharge  Left eye: No discharge  Pulmonary:      Effort: Pulmonary effort is normal  No respiratory distress  Musculoskeletal:      Cervical back: No rigidity  Skin:     Coloration: Skin is not jaundiced or pale  Neurological:      Mental Status: She is alert  Mental status is at baseline  Motor: Abnormal muscle tone present  Psychiatric:         Behavior: Behavior is cooperative         Assessment/Plan:  80year old female with:    Late onset Alzheimer's disease without behavioral disturbance ([facility] [address] )  With progressive decline; dependent for all ADL care including total assistance for feeding  Continue LTC for 24/7 and ADL care  Management of chronic medical conditions as outlined   Supportive care, delirium precautions    Hypothyroidism  Continue levothyroxine 50mcg daily  No further labs per previous discussion with daughter with goal of comfort measures    Chronic deep vein thrombosis (DVT) [facility])  Continue anticoagulation with xarelto  No further labs including CBC or BMP monitoring while on anticoagulation with comfort as goal    [address], DO  5/10/23

## 2023-06-02 NOTE — ASSESSMENT & PLAN NOTE
Continue levothyroxine 50mcg daily  No further labs per previous discussion with daughter with goal of comfort measures

## 2023-06-02 NOTE — ASSESSMENT & PLAN NOTE
With progressive decline; dependent for all ADL care including total assistance for feeding  Continue LTC for 24/7 and ADL care  Management of chronic medical conditions as outlined   Supportive care, delirium precautions

## 2023-06-02 NOTE — ASSESSMENT & PLAN NOTE
Continue anticoagulation with xarelto  No further labs including CBC or BMP monitoring while on anticoagulation with comfort as goal

## 2023-06-06 ENCOUNTER — NURSING HOME VISIT (OUTPATIENT)
Dept: GERIATRICS | Facility: OTHER | Age: 88
End: 2023-06-06
Payer: MEDICARE

## 2023-06-06 DIAGNOSIS — E03.9 HYPOTHYROIDISM, UNSPECIFIED TYPE: ICD-10-CM

## 2023-06-06 DIAGNOSIS — F02.80 LATE ONSET ALZHEIMER'S DISEASE WITHOUT BEHAVIORAL DISTURBANCE (HCC): Primary | ICD-10-CM

## 2023-06-06 DIAGNOSIS — F45.8 BRUXISM (TEETH GRINDING): ICD-10-CM

## 2023-06-06 DIAGNOSIS — I82.501 CHRONIC DEEP VEIN THROMBOSIS (DVT) OF RIGHT LOWER EXTREMITY, UNSPECIFIED VEIN (HCC): ICD-10-CM

## 2023-06-06 DIAGNOSIS — R13.10 DYSPHAGIA, UNSPECIFIED TYPE: ICD-10-CM

## 2023-06-06 DIAGNOSIS — G30.1 LATE ONSET ALZHEIMER'S DISEASE WITHOUT BEHAVIORAL DISTURBANCE (HCC): Primary | ICD-10-CM

## 2023-06-06 PROCEDURE — 99309 SBSQ NF CARE MODERATE MDM 30: CPT | Performed by: NURSE PRACTITIONER

## 2023-06-06 NOTE — PROGRESS NOTES
Facility: [facility]   [address] [place] [address] [address]  POS: 32 (LTC)  Progress Note    Chief Complaint/Reason for visit: LTC follow up visit  Code Status: DNR  History of Present Illness: 78-year-old female seen and examined for LTC follow up of chronic medical conditions  Received patient seated in wheelchair  No acute issues reported by nursing staff  She continues to grind her teeth and refuses to take medications at times by not opening her mouth  Transfers with [employer]  No further blood work with comfort as goal   Past Medical History: unchanged from history and physical  Past Medical History:   Diagnosis Date   • Alzheimer's dementia ([facility] [address] )    • Anemia    • Aphasia    • Chronic diastolic CHF (congestive heart failure) ([facility])    • Depression    • DVT (deep venous thrombosis) ([facility])    • Hyperlipidemia    • Hypothyroidism    • OA (osteoarthritis)    • Osteoporosis      Family History: unchanged from history and physical  Social History: unchanged from history and physical  Review of systems: Review of systems  Unable to perform ROS: Dementia  Medications: All medication and routine orders were reviewed and updated  Allergies: NKDA  Consults reviewed: Other  Labs/Diagnostics (reviewed by this provider): No further blood work; comfort care goal    Imaging Reviewed: None recent    Physical Exam  Weight: 158 8 pounds Temp: 97 6           BP: 128/76 on 6/1/2023  pulse: 72 on exam resp: 16       O2 Sat: 92% on room air on exam  Orientation: Confused and disoriented    Physical Exam  Vitals and nursing note reviewed  Constitutional:       General: She is not in acute distress  Appearance: She is not toxic-appearing or diaphoretic  Comments: Chronically ill-appearing   HENT:      Head: Normocephalic  Nose: No congestion or rhinorrhea  Mouth/Throat:      Mouth: Mucous membranes are moist       Pharynx: No oropharyngeal exudate  Comments: Poor dentition    Chronic teeth grinding when awake  Eyes:      General:         Right eye: No discharge  Left eye: No discharge  Extraocular Movements: Extraocular movements intact  Conjunctiva/sclera: Conjunctivae normal       Pupils: Pupils are equal, round, and reactive to light  Cardiovascular:      Rate and Rhythm: Normal rate  Pulses: Normal pulses  Pulmonary:      Effort: Pulmonary effort is normal  No respiratory distress  Breath sounds: No wheezing or rhonchi  Abdominal:      General: Bowel sounds are normal  There is no distension  Palpations: Abdomen is soft  Tenderness: There is no abdominal tenderness  There is no guarding  Musculoskeletal:      Cervical back: Neck supple  No rigidity  Right lower leg: No edema  Left lower leg: No edema  Comments: Becomes rigid when disturbed  Bilateral upper extremity tremors noted when disturbed   Lymphadenopathy:      Cervical: No cervical adenopathy  Skin:     General: Skin is warm and dry  Capillary Refill: Capillary refill takes less than 2 seconds  Comments: Bilateral heel protectors in place  Neurological:      Mental Status: She is alert  Mental status is at baseline  Psychiatric:         Mood and Affect: Mood normal          Behavior: Behavior normal          Thought Content:  Thought content normal       Comments: Patient became agitated during exam       Assessment/Plan:  27-year-old female with:    Late onset Alzheimer's disease without behavioral disturbance ([facility] [address] )  With progressive decline  Continue 24/7 care and support at long-term care facility  Requires complete assist with all ADLs  Amisha lift for transfers  Skin breakdown prevention measures in place by nursing staff  Delirium precautions    Chronic deep vein thrombosis (DVT) ([facility] [address] )  Continue anticoagulation with Xarelto  No signs or symptoms of active bleeding noted    Hypothyroidism  Continue levothyroxine 50 mcg daily  No further blood work as per [employer] wishes    Dysphagia  Continue modified diet  Requires total assist for for all meals  Continue aspiration precautions    Bruxism (teeth grinding)  Patient continues to grind her teeth while awake  Continue scheduled Tylenol, baclofen, and gabapentin    This note was completed in part utilizing [employer] [facility] [employer] direct voice recognition software  Grammatical errors, random word insertion, spelling mistakes, and incomplete sentences may be an occasional consequence of the system secondary to software limitations, ambient noise and hardware issues  At the time of dictation, efforts were made to edit, clarify and/or correct errors  Please read the chart carefully and recognize, using context, where substitutions have occurred  If you have any questions or concerns about the context, text or information contained within the body of this dictation, please contact myself, the provider, for further clarification      [address], [address] Saul Soriano  6/6/20232:11 PM

## 2023-06-06 NOTE — ASSESSMENT & PLAN NOTE
Patient continues to grind her teeth while awake  Continue scheduled Tylenol, baclofen, and gabapentin

## 2023-06-06 NOTE — ASSESSMENT & PLAN NOTE
With progressive decline  Continue 24/7 care and support at long-term care facility  Requires complete assist with all ADLs  Tierra Luevano lift for transfers  Skin breakdown prevention measures in place by nursing staff  Delirium precautions

## 2023-07-05 ENCOUNTER — NURSING HOME VISIT (OUTPATIENT)
Dept: GERIATRICS | Facility: OTHER | Age: 88
End: 2023-07-05
Payer: MEDICARE

## 2023-07-05 DIAGNOSIS — R13.10 DYSPHAGIA, UNSPECIFIED TYPE: ICD-10-CM

## 2023-07-05 DIAGNOSIS — R15.9 BOWEL AND BLADDER INCONTINENCE: ICD-10-CM

## 2023-07-05 DIAGNOSIS — R32 BOWEL AND BLADDER INCONTINENCE: ICD-10-CM

## 2023-07-05 DIAGNOSIS — G30.1 LATE ONSET ALZHEIMER'S DISEASE WITHOUT BEHAVIORAL DISTURBANCE (HCC): Primary | ICD-10-CM

## 2023-07-05 DIAGNOSIS — F02.80 LATE ONSET ALZHEIMER'S DISEASE WITHOUT BEHAVIORAL DISTURBANCE (HCC): Primary | ICD-10-CM

## 2023-07-05 PROCEDURE — 99308 SBSQ NF CARE LOW MDM 20: CPT | Performed by: FAMILY MEDICINE

## 2023-07-05 NOTE — PROGRESS NOTES
[facility]   [address], [place]  Progress Note  POS: Nursing Facility/LTC-32    Unable to obtain from patient due to: Dementia; history obtained speaking with nursing and medical record review  Chief Complaint/Reason for visit: Follow up of chronic medical conditions  History of Present Illness: 80year old female evaluated for routine follow up of chronic medical conditions. Weight stable over past month. No concerns reported per nursing. Review of systems: Review of Systems   Unable to perform ROS: Dementia     Medications: No changes made  Allergies: NKDA    Physical Exam    Vitals reviewed in SNF EMR    Physical Exam  Vitals and nursing note reviewed. Constitutional:       General: She is sleeping. She is not in acute distress. Appearance: She is well-groomed. She is not ill-appearing, toxic-appearing or diaphoretic. HENT:      Head: Normocephalic and atraumatic. Nose: No rhinorrhea. Mouth/Throat:      Mouth: Mucous membranes are moist.   Eyes:      General:         Right eye: No discharge. Left eye: No discharge. Pulmonary:      Effort: Pulmonary effort is normal. No respiratory distress. Abdominal:      General: There is no distension. Musculoskeletal:      Cervical back: No rigidity. Skin:     Coloration: Skin is not jaundiced or pale. Neurological:      Mental Status: Mental status is at baseline. Motor: Abnormal muscle tone (b/l UEs, at baseline) present. Psychiatric:         Speech: She is noncommunicative.        Assessment/Plan:  80year old female with:    Late onset Alzheimer's disease without behavioral disturbance ([address])  Dependent for all ADL care including total assistance for feeding  Continue LTC for 24/7 and ADL care  Management of chronic medical conditions as outlined   Supportive care, delirium precautions    Bowel and bladder incontinence  In setting of above  Routine repositioning and incontinence care    Dysphagia  Continue modified diet  Total assist for feeding  Aspiration precautions  Continue to monitor weights monthly- stable over past month    [address],   7/5/23

## 2023-07-28 PROBLEM — S99.922A INJURY OF LEFT GREAT TOE: Status: RESOLVED | Noted: 2023-03-27 | Resolved: 2023-07-28

## 2023-07-28 PROBLEM — S99.922A INJURY OF LEFT TOE: Status: RESOLVED | Noted: 2023-03-27 | Resolved: 2023-07-28

## 2023-07-28 NOTE — ASSESSMENT & PLAN NOTE
Dependent for all ADL care including total assistance for feeding  Continue LTC for 24/7 and ADL care  Management of chronic medical conditions as outlined   Supportive care, delirium precautions

## 2023-07-28 NOTE — ASSESSMENT & PLAN NOTE
Continue modified diet  Total assist for feeding  Aspiration precautions  Continue to monitor weights monthly- stable over past month

## 2023-08-07 ENCOUNTER — NURSING HOME VISIT (OUTPATIENT)
Dept: GERIATRICS | Facility: OTHER | Age: 88
End: 2023-08-07
Payer: MEDICARE

## 2023-08-07 DIAGNOSIS — F02.80 LATE ONSET ALZHEIMER'S DISEASE WITHOUT BEHAVIORAL DISTURBANCE (HCC): Primary | ICD-10-CM

## 2023-08-07 DIAGNOSIS — R13.10 DYSPHAGIA, UNSPECIFIED TYPE: ICD-10-CM

## 2023-08-07 DIAGNOSIS — G30.1 LATE ONSET ALZHEIMER'S DISEASE WITHOUT BEHAVIORAL DISTURBANCE (HCC): Primary | ICD-10-CM

## 2023-08-07 DIAGNOSIS — R53.81 DEBILITY: ICD-10-CM

## 2023-08-07 DIAGNOSIS — F45.8 BRUXISM (TEETH GRINDING): ICD-10-CM

## 2023-08-07 PROCEDURE — 99309 SBSQ NF CARE MODERATE MDM 30: CPT | Performed by: NURSE PRACTITIONER

## 2023-08-07 NOTE — ASSESSMENT & PLAN NOTE
Dysphagia 1 diet.   Family bites of baby food if patient does not eat meals provided at facility  Requires total assist for feedings  Patient has gained 4.4 lbs in one month  Aspiration precautions

## 2023-08-07 NOTE — ASSESSMENT & PLAN NOTE
Continue 24/7 care and support for ADLs; IADLs  Dependent for all ADL needs  Continue toileting schedule and RNP program to prevent deconditioning  Transfers with parvin lift  No behavior issues reported  Skin prevention measures in place by nursing staff  Cpntinue delirium precautions

## 2023-08-07 NOTE — PROGRESS NOTES
Facility: [facility]  [address] [facility], [place]  POS: 32 (LTC)  Progress Note    Chief Complaint/Reason for visit: LTC follow up visit  Code status: DNR  History of Present Illness: 25-year-old female seen and examined for LTC follow up of chronic medical conditions. Received patient seated in wheelchair and appeared in no distress. Patient requires complete assist with all ADLs due to advanced dementia. Continues to grind her teeth continuously while awake. Patient has gained 4.4 pounds in 1 month. Vital signs are stable. She is on a toileting schedule due to incontinence. No acute concerns reported by nursing staff. Past Medical History: unchanged from history and physical  Past Medical History:   Diagnosis Date   • Alzheimer's dementia ([address])    • Anemia    • Aphasia    • Chronic diastolic CHF (congestive heart failure) ([facility])    • Depression    • DVT (deep venous thrombosis) ([facility])    • Hyperlipidemia    • Hypothyroidism    • OA (osteoarthritis)    • Osteoporosis      Family History: unchanged from history and physical  Social History: unchanged from history and physical  Review of systems: As per review of medical illness, all other systems reviewed and negative. Medications: All medication and routine orders were reviewed and updated  Allergies: NKDA  Consults reviewed: Other  Labs/Diagnostics (reviewed by this provider): Copy in Chart    Imaging Reviewed: None today    Physical Exam  Weight: 157 pounds Temp: 97.5         BP: 112/66  pulse: 71     resp: 18         Constitutional: Well-nourished  Orientation: Confused and disoriented    Physical Exam  Vitals and nursing note reviewed. Constitutional:       General: She is not in acute distress. Appearance: She is not toxic-appearing or diaphoretic. Comments: Elderly female who appears with chronic illness. HENT:      Nose: No congestion or rhinorrhea.       Mouth/Throat:      Mouth: Mucous membranes are moist.      Pharynx: No oropharyngeal exudate. Comments: Will not open her mouth for exam. Grinding teeth continuously during visit. Eyes:      General:         Right eye: No discharge. Left eye: No discharge. Conjunctiva/sclera: Conjunctivae normal.      Pupils: Pupils are equal, round, and reactive to light. Cardiovascular:      Rate and Rhythm: Normal rate. Pulses: Normal pulses. Pulmonary:      Effort: Pulmonary effort is normal. No respiratory distress. Breath sounds: No rhonchi. Abdominal:      General: Bowel sounds are normal. There is no distension. Palpations: Abdomen is soft. Musculoskeletal:      Cervical back: Neck supple. No rigidity. Right lower leg: No edema. Left lower leg: No edema. Comments: Moves all 4 extremities. Lymphadenopathy:      Cervical: No cervical adenopathy. Skin:     General: Skin is warm and dry. Capillary Refill: Capillary refill takes less than 2 seconds. Neurological:      Mental Status: She is alert. Mental status is at baseline. Motor: Weakness present. Comments: Nonverbal, with severe cognitive and memory impairment. Psychiatric:         Mood and Affect: Mood normal.       Assessment/Plan:  80-year-old female with:    Late onset Alzheimer's disease without behavioral disturbance ([address])  Continue 24/7 care and support for ADLs; IADLs  Dependent for all ADL needs  Continue toileting schedule and RNP program to prevent deconditioning  Transfers with parvin lift  No behavior issues reported  Skin prevention measures in place by nursing staff  Cpntinue delirium precautions    Dysphagia  Dysphagia 1 diet.   Family bites of baby food if patient does not eat meals provided at facility  Requires total assist for feedings  Patient has gained 4.4 lbs in one month  Aspiration precautions    Hypothyroidism  Continue levothyroxine 50 mcg daily  No further blood work as per daughter's wishes    Bruxism (teeth grinding)  Continue scheduled Tylenol, baclofen, and gabapentin    This note was completed in part utilizing [employer] direct voice recognition software. Grammatical errors, random word insertion, spelling mistakes, and incomplete sentences may be an occasional consequence of the system secondary to software limitations, ambient noise and hardware issues. At the time of dictation, efforts were made to edit, clarify and/or correct errors. Please read the chart carefully and recognize, using context, where substitutions have occurred. If you have any questions or concerns about the context, text or information contained within the body of this dictation, please contact myself, the provider, for further clarification.     [address] [place] [address]  6/3/176479:78 PM

## 2023-09-11 ENCOUNTER — NURSING HOME VISIT (OUTPATIENT)
Dept: GERIATRICS | Facility: OTHER | Age: 88
End: 2023-09-11
Payer: MEDICARE

## 2023-09-11 DIAGNOSIS — M62.838 MUSCLE SPASM: ICD-10-CM

## 2023-09-11 DIAGNOSIS — E03.8 OTHER SPECIFIED HYPOTHYROIDISM: ICD-10-CM

## 2023-09-11 DIAGNOSIS — G30.1 LATE ONSET ALZHEIMER'S DISEASE WITHOUT BEHAVIORAL DISTURBANCE (HCC): Primary | ICD-10-CM

## 2023-09-11 DIAGNOSIS — R15.9 BOWEL AND BLADDER INCONTINENCE: ICD-10-CM

## 2023-09-11 DIAGNOSIS — R32 BOWEL AND BLADDER INCONTINENCE: ICD-10-CM

## 2023-09-11 DIAGNOSIS — I82.501 CHRONIC DEEP VEIN THROMBOSIS (DVT) OF RIGHT LOWER EXTREMITY, UNSPECIFIED VEIN (HCC): ICD-10-CM

## 2023-09-11 DIAGNOSIS — F02.80 LATE ONSET ALZHEIMER'S DISEASE WITHOUT BEHAVIORAL DISTURBANCE (HCC): Primary | ICD-10-CM

## 2023-09-11 DIAGNOSIS — R53.81 DEBILITY: ICD-10-CM

## 2023-09-11 DIAGNOSIS — F45.8 BRUXISM (TEETH GRINDING): ICD-10-CM

## 2023-09-11 DIAGNOSIS — R13.19 OTHER DYSPHAGIA: ICD-10-CM

## 2023-09-11 PROCEDURE — 99309 SBSQ NF CARE MODERATE MDM 30: CPT | Performed by: FAMILY MEDICINE

## 2023-09-11 NOTE — PROGRESS NOTES
[address]  [phone]  Monica Brown      NAME: Lance Morales  AGE: 80 y.o. SEX: female [phone]    DATE OF ENCOUNTER: 9/11/2023    Assessment and Plan     Problem List Items Addressed This Visit        Digestive    Dysphagia     Continue dysphagia 1 diet. Continue to assist for feedings         Bruxism (teeth grinding)       Endocrine    Hypothyroidism     Continue levothyroxine 50 mcg daily  No further blood work as per daughter's wishes            Cardiovascular and Mediastinum    Chronic deep vein thrombosis (DVT) (HCC)     Stable  Continue AC with Xarelto            Nervous and Auditory    Late onset Alzheimer's disease without behavioral disturbance ([address]) - Primary     Continue to support with ADLs, IADLs at long-term care facility. Continue toileting schedule and NP program to event deconditioning  Transfer with [employer] lift  Encouraged frequent family visits  Delirium precautions            Other    Debility     Continue 24/7 care and support  Adequate hydration and nutrition         Muscle spasm     Maintain on baclofen 10 mg daily         Bowel and bladder incontinence     Schedule toileting, repositioning            Chief Complaint     Routine Long term follow-up visit. History of Present Illness     HPI  The patient is seen in her room during routine long-term care visit. She is sitting in recliner, comfortably. No concern from nursing staff and family.     The following portions of the patient's history were reviewed and updated as appropriate: allergies, current medications, past family history, past medical history, past social history, past surgical history and problem list.    Review of Systems     Review of Systems   Unable to perform ROS: Dementia       Active Problem List     Patient Active Problem List   Diagnosis   • Debility   • Late onset Alzheimer's disease without behavioral disturbance ([address])   • Hypothyroidism   • Chronic deep vein thrombosis (DVT) (HCC)   • Periodontal disease   • Dysphagia   • Muscle spasm   • Bowel and bladder incontinence   • Other constipation   • Poor appetite   • Bruxism (teeth grinding)       Objective     Vitals: SNF EMR reviewed    Physical Exam  Constitutional:       General: She is not in acute distress. HENT:      Head: Normocephalic and atraumatic. Nose: Nose normal.      Mouth/Throat:      Mouth: Mucous membranes are moist.   Cardiovascular:      Rate and Rhythm: Normal rate. Pulmonary:      Effort: Pulmonary effort is normal. No respiratory distress. Neurological:      Comments: Not answer or follow commands. Pertinent Laboratory/Diagnostic Studies:  Refer to facility chart. Current Medications   Medications reviewed and updated in facility chart.

## 2023-09-12 NOTE — ASSESSMENT & PLAN NOTE
Continue to support with ADLs, IADLs at long-term care facility.   Continue toileting schedule and NP program to event deconditioning  Transfer with Augie Summerfield lift  Encouraged frequent family visits  Delirium precautions

## 2023-10-10 ENCOUNTER — NURSING HOME VISIT (OUTPATIENT)
Dept: GERIATRICS | Facility: OTHER | Age: 88
End: 2023-10-10
Payer: MEDICARE

## 2023-10-10 DIAGNOSIS — R53.81 DEBILITY: ICD-10-CM

## 2023-10-10 DIAGNOSIS — G30.1 LATE ONSET ALZHEIMER'S DISEASE WITHOUT BEHAVIORAL DISTURBANCE (HCC): Primary | ICD-10-CM

## 2023-10-10 DIAGNOSIS — R13.19 OTHER DYSPHAGIA: ICD-10-CM

## 2023-10-10 DIAGNOSIS — F45.8 BRUXISM (TEETH GRINDING): ICD-10-CM

## 2023-10-10 DIAGNOSIS — I82.501 CHRONIC DEEP VEIN THROMBOSIS (DVT) OF RIGHT LOWER EXTREMITY, UNSPECIFIED VEIN (HCC): ICD-10-CM

## 2023-10-10 DIAGNOSIS — F02.80 LATE ONSET ALZHEIMER'S DISEASE WITHOUT BEHAVIORAL DISTURBANCE (HCC): Primary | ICD-10-CM

## 2023-10-10 DIAGNOSIS — E03.8 OTHER SPECIFIED HYPOTHYROIDISM: ICD-10-CM

## 2023-10-10 PROCEDURE — 99309 SBSQ NF CARE MODERATE MDM 30: CPT | Performed by: NURSE PRACTITIONER

## 2023-10-10 NOTE — PROGRESS NOTES
Facility: [facility]  [address] [place] [address]  POS: 32 (LTC)  Progress Note    Chief Complaint/Reason for visit: LTC follow-up visit  Code status: DNR  History of Present Illness: 27-year-old female seen and examined for LT follow-up of chronic medical conditions. Received patient resting in bed and appeared in no distress. Patient was grinding her teeth and did not allow oral exam.  No acute concerns reported by nursing staff. CN reported that patient has been eating and no changes noted in behavior. Patient requires complete assist with ADLs. See A/P for additional information. Past Medical History: unchanged from history and physical  Past Medical History:   Diagnosis Date   • Alzheimer's dementia ([address])    • Anemia    • Aphasia    • Chronic diastolic CHF (congestive heart failure) ([facility])    • Depression    • DVT (deep venous thrombosis) ([facility])    • Hyperlipidemia    • Hypothyroidism    • OA (osteoarthritis)    • Osteoporosis      Family History: unchanged from history and physical  Social History: unchanged from history and physical  Review of systems: As per review of medical illness, all other systems reviewed and negative. Medications: All medication and routine orders were reviewed and updated  Allergies: NKDA  Consults reviewed: Other  Labs/Diagnostics (reviewed by this provider): No recent blood work    Imaging Reviewed: None today  Physical Exam  Monthly vital signs have been stabl  Orientation: Alert and nonverbal    Physical Exam  Vitals and nursing note reviewed. Constitutional:       General: She is not in acute distress. Appearance: She is not toxic-appearing or diaphoretic. Comments: Elderly female who appears with chronic illness. In no distress. HENT:      Nose: No congestion or rhinorrhea. Mouth/Throat:      Pharynx: No oropharyngeal exudate. Eyes:      General:         Right eye: No discharge. Left eye: No discharge.       Conjunctiva/sclera: Conjunctivae normal.   Cardiovascular:      Rate and Rhythm: Normal rate. Pulses: Normal pulses. Pulmonary:      Effort: Pulmonary effort is normal. No respiratory distress. Breath sounds: Normal breath sounds. No wheezing, rhonchi or rales. Abdominal:      General: Bowel sounds are normal. There is no distension. Palpations: Abdomen is soft. Tenderness: There is no abdominal tenderness. There is no guarding. Musculoskeletal:      Right lower leg: No edema. Left lower leg: No edema. Comments: Nonambulatory. Skin:     General: Skin is warm and dry. Capillary Refill: Capillary refill takes less than 2 seconds. Comments: Heel protectors are in place. Neurological:      Mental Status: She is alert. Mental status is at baseline. Motor: Weakness present.       Comments: Alert and nonverbal   Psychiatric:         Mood and Affect: Mood normal.         Behavior: Behavior normal.       Assessment/Plan:  49-year-old female with:    Late onset Alzheimer's disease without behavioral disturbance (HCC)  With progressive decline and requires max to complete assist with all ADLs  Transfers with Marixa Lau lift  Continue 24/7 care and support at long-term care facility for ADLs; IADLs  Continue delirium precautions  Family is very supportive  Continue RNP program to prevent deconditioning    Dysphagia  Continue dysphagia 1 diet  Requires feeding  Family provides baby food if patient does not eat her meals at facility  Weights have been stable    Bruxism (teeth grinding)  Continues with bruxism  Continue scheduled liquid Tylenol, baclofen, and gabapentin    Chronic deep vein thrombosis (DVT) ([facility])  Stable on Xarelto for anticoagulation    Hypothyroidism  Continue levothyroxine 50 mcg daily  No further blood work as per daughter's wishes    Debility  Multifactorial in setting of chronic medical conditions  Continue supportive care at long-term care facility  Scheduled toileting for incontinence care  Skin breakdown prevention measures in place by nursing staff    This note was completed in part utilizing [employer] direct voice recognition software. Grammatical errors, random word insertion, spelling mistakes, and incomplete sentences may be an occasional consequence of the system secondary to software limitations, ambient noise and hardware issues. At the time of dictation, efforts were made to edit, clarify and/or correct errors. Please read the chart carefully and recognize, using context, where substitutions have occurred. If you have any questions or concerns about the context, text or information contained within the body of this dictation, please contact myself, the provider, for further clarification.     [facility] [address] [place]  88/90/633863:50 PM

## 2023-10-10 NOTE — ASSESSMENT & PLAN NOTE
With progressive decline and requires max to complete assist with all ADLs  Transfers with Melven Adie lift  Continue 24/7 care and support at long-term care facility for ADLs; IADLs  Continue delirium precautions  Family is very supportive  Continue RNP program to prevent deconditioning

## 2023-10-10 NOTE — ASSESSMENT & PLAN NOTE
Continue dysphagia 1 diet  Requires feeding  Family provides baby food if patient does not eat her meals at facility  Weights have been stable

## 2023-10-10 NOTE — ASSESSMENT & PLAN NOTE
Multifactorial in setting of chronic medical conditions  Continue supportive care at long-term care facility  Scheduled toileting for incontinence care  Skin breakdown prevention measures in place by nursing staff

## 2023-11-27 ENCOUNTER — NURSING HOME VISIT (OUTPATIENT)
Dept: GERIATRICS | Facility: OTHER | Age: 88
End: 2023-11-27
Payer: MEDICARE

## 2023-11-27 DIAGNOSIS — I82.501 CHRONIC DEEP VEIN THROMBOSIS (DVT) OF RIGHT LOWER EXTREMITY, UNSPECIFIED VEIN (HCC): ICD-10-CM

## 2023-11-27 DIAGNOSIS — M62.838 MUSCLE SPASM: ICD-10-CM

## 2023-11-27 DIAGNOSIS — F02.80 LATE ONSET ALZHEIMER'S DISEASE WITHOUT BEHAVIORAL DISTURBANCE (HCC): Primary | ICD-10-CM

## 2023-11-27 DIAGNOSIS — F45.8 BRUXISM (TEETH GRINDING): ICD-10-CM

## 2023-11-27 DIAGNOSIS — E03.8 OTHER SPECIFIED HYPOTHYROIDISM: ICD-10-CM

## 2023-11-27 DIAGNOSIS — G30.1 LATE ONSET ALZHEIMER'S DISEASE WITHOUT BEHAVIORAL DISTURBANCE (HCC): Primary | ICD-10-CM

## 2023-11-27 DIAGNOSIS — R13.19 OTHER DYSPHAGIA: ICD-10-CM

## 2023-11-27 DIAGNOSIS — R53.81 DEBILITY: ICD-10-CM

## 2023-11-27 PROCEDURE — 99309 SBSQ NF CARE MODERATE MDM 30: CPT | Performed by: FAMILY MEDICINE

## 2023-11-27 NOTE — PROGRESS NOTES
[address]  [phone]  Monica Armas 89    NAME: Andrews Morales  AGE: 80 y.o. SEX: female [phone]    DATE OF ENCOUNTER: 11/27/2023    Assessment and Plan     Late onset Alzheimer's disease without behavioral disturbance ([address])  With progressive decline and requires max to complete assist with all ADLs  Transfers with Lorrayne Kraft lift  Continue 24/7 care and support at long-term care facility for ADLs; IADLs  Continue delirium precautions  Family is very supportive  Continue RNP program to prevent deconditioning    Bruxism (teeth grinding)  Continues with bruxism  Continue scheduled liquid Tylenol, baclofen, and gabapentin    Dysphagia  Continue dysphagia 1 diet  Staff and family assist with feeding  Family provides baby food if patient does not eat her meals at facility  Weights have been stable    Hypothyroidism  Continue levothyroxine 50 mcg daily  No further blood work as per daughter's wishes    Chronic deep vein thrombosis (DVT) ([address])  Stable on Xarelto for anticoagulation    Debility  Multifactorial in setting of chronic medical conditions  Continue supportive care at long-term care facility  incontinence care  Skin breakdown prevention measures in place by nursing staff    Muscle spasm  Maintain on baclofen 10 mg daily    Other constipation  Continue senna S two p.o. daily  Continue Dulcolax suppository and MiraLax p.r.n. Discussed with charge nurse. Chief Complaint     Routine Long term follow-up visit. History of Present Illness     HPI  The patient is seen during routine long-term follow-up visit. Patient is resting in bed and appears in no distress. Patient requires assist with ADLs, out of bed via [id number] [place]. No other concerns from nursing staff.     The following portions of the patient's history were reviewed and updated as appropriate: allergies, current medications, past family history, past medical history, past social history, past surgical history and problem list.    Review of Systems     Review of Systems   Unable to perform ROS: Dementia     Active Problem List     Patient Active Problem List   Diagnosis    Debility    Late onset Alzheimer's disease without behavioral disturbance (HCC)    Hypothyroidism    Chronic deep vein thrombosis (DVT) (HCC)    Periodontal disease    Dysphagia    Muscle spasm    Bowel and bladder incontinence    Other constipation    Poor appetite    Bruxism (teeth grinding)     Objective     Vitals:  Vitals:    11/27/23 0615   BP: 122/68   Pulse: 72   Resp: 18   Temp: 98 °F (36.7 °C)   Weight: 70.6 kg (155 lb 9.6 oz)       Physical Exam  Constitutional:       General: She is not in acute distress. HENT:      Head: Normocephalic and atraumatic. Nose: Nose normal.      Mouth/Throat:      Mouth: Mucous membranes are moist.   Eyes:      General:         Right eye: No discharge. Left eye: No discharge. Conjunctiva/sclera: Conjunctivae normal.   Cardiovascular:      Rate and Rhythm: Normal rate and regular rhythm. Pulmonary:      Effort: Pulmonary effort is normal. No respiratory distress. Abdominal:      General: Bowel sounds are normal.   Musculoskeletal:      Comments: Left hand contracture  B/l LE in boots   Neurological:      Mental Status: She is alert. Comments: Nonverbal. Does not follows command   Psychiatric:         Mood and Affect: Mood normal.         Behavior: Behavior normal.       Pertinent Laboratory/Diagnostic Studies:  Refer to facility chart. Current Medications   Medications reviewed and updated in facility chart.     Marcell Castro MD

## 2023-11-28 VITALS
RESPIRATION RATE: 18 BRPM | DIASTOLIC BLOOD PRESSURE: 68 MMHG | TEMPERATURE: 98 F | SYSTOLIC BLOOD PRESSURE: 122 MMHG | HEART RATE: 72 BPM | WEIGHT: 155.6 LBS

## 2023-11-28 NOTE — ASSESSMENT & PLAN NOTE
Continue dysphagia 1 diet  Staff and family assist with feeding  Family provides baby food if patient does not eat her meals at facility  Weights have been stable

## 2023-11-28 NOTE — ASSESSMENT & PLAN NOTE
Multifactorial in setting of chronic medical conditions  Continue supportive care at long-term care facility  incontinence care  Skin breakdown prevention measures in place by nursing staff

## 2024-01-23 ENCOUNTER — NURSING HOME VISIT (OUTPATIENT)
Dept: GERIATRICS | Facility: OTHER | Age: 89
End: 2024-01-23
Payer: MEDICARE

## 2024-01-23 DIAGNOSIS — E03.8 OTHER SPECIFIED HYPOTHYROIDISM: ICD-10-CM

## 2024-01-23 DIAGNOSIS — F02.80 LATE ONSET ALZHEIMER'S DISEASE WITHOUT BEHAVIORAL DISTURBANCE (HCC): Primary | ICD-10-CM

## 2024-01-23 DIAGNOSIS — R53.81 DEBILITY: ICD-10-CM

## 2024-01-23 DIAGNOSIS — G30.1 LATE ONSET ALZHEIMER'S DISEASE WITHOUT BEHAVIORAL DISTURBANCE (HCC): Primary | ICD-10-CM

## 2024-01-23 PROCEDURE — 99309 SBSQ NF CARE MODERATE MDM 30: CPT | Performed by: FAMILY MEDICINE

## 2024-01-23 NOTE — ASSESSMENT & PLAN NOTE
Multifactorial in setting of chronic medical conditions, dementia  Continue supportive care at long-term care facility  incontinence care  Skin breakdown prevention measures in place by nursing staff

## 2024-01-23 NOTE — PROGRESS NOTES
[facility]  [address] [facility] [address] [facility] [address] [phone]  [facility]  POS 32    NAME: Lucrecia Morales  AGE: 89 y.o. SEX: female [phone]    DATE OF ENCOUNTER: 1/23/2024    Assessment and Plan     Late onset Alzheimer's disease without behavioral disturbance (HCC)  With progressive decline and requires max to complete assist with all ADLs  Transfers with Amisha lift  Continue 24/7 care and support at long-term care facility for ADLs; IADLs  Continue delirium precautions  Family is very supportive  Continue RNP program to prevent deconditioning    Dysphagia  Continue dysphagia 1 diet  Staff and family assist with feeding  Family provides baby food if patient does not eat her meals at facility  Weights have been stable    Hypothyroidism  Continue levothyroxine 50 mcg daily  No further blood work as per daughter's wishes    Chronic deep vein thrombosis (DVT) (HCC)  Stable on Xarelto for anticoagulation    Debility  Multifactorial in setting of chronic medical conditions, dementia  Continue supportive care at long-term care facility  incontinence care  Skin breakdown prevention measures in place by nursing staff    Muscle spasm  Maintain on baclofen 10 mg daily      Chief Complaint     Routine Long term follow-up visit.    History of Present Illness     HPI  The patient is seen in her room during routine long-term follow-up visit today.  She is sitting in recliner, nonverbal.  Per nursing report, patient sleeps most of the day, little interest to group activities.  No agitation reported.  The following portions of the patient's history were reviewed and updated as appropriate: allergies, current medications, past family history, past medical history, past social history, past surgical history and problem list.    Review of Systems     Review of Systems   Unable to perform ROS: Dementia       Active Problem List     Patient Active Problem List   Diagnosis    Debility    Late onset Alzheimer's  disease without behavioral disturbance (HCC)    Hypothyroidism    Chronic deep vein thrombosis (DVT) (HCC)    Periodontal disease    Dysphagia    Muscle spasm    Bowel and bladder incontinence    Other constipation    Poor appetite    Bruxism (teeth grinding)       Objective     Vitals: EHR reviewed, stable. Wt stable    Physical Exam  Vitals reviewed.   Constitutional:       General: She is not in acute distress.  HENT:      Head: Normocephalic.      Nose: Nose normal.   Cardiovascular:      Rate and Rhythm: Normal rate and regular rhythm.   Pulmonary:      Effort: Pulmonary effort is normal.      Breath sounds: Normal breath sounds.   Abdominal:      General: Bowel sounds are normal.   Musculoskeletal:      Right lower leg: No edema.      Left lower leg: No edema.   Skin:     General: Skin is warm.   Neurological:      Comments: Nonverbal, no eye contact  Does not answer or follow commands       Pertinent Laboratory/Diagnostic Studies:  Refer to facility chart.    Current Medications   Medications reviewed and updated in facility chart.

## 2024-01-23 NOTE — ASSESSMENT & PLAN NOTE
With progressive decline and requires max to complete assist with all ADLs  Transfers with Amisha lift  Continue 24/7 care and support at long-term care facility for ADLs; IADLs  Continue delirium precautions  Family is very supportive  Continue RNP program to prevent deconditioning

## 2024-03-05 ENCOUNTER — NURSING HOME VISIT (OUTPATIENT)
Dept: GERIATRICS | Facility: OTHER | Age: 89
End: 2024-03-05
Payer: MEDICARE

## 2024-03-05 VITALS
OXYGEN SATURATION: 96 % | WEIGHT: 150.2 LBS | HEART RATE: 91 BPM | DIASTOLIC BLOOD PRESSURE: 69 MMHG | RESPIRATION RATE: 18 BRPM | TEMPERATURE: 97.7 F | SYSTOLIC BLOOD PRESSURE: 111 MMHG

## 2024-03-05 DIAGNOSIS — R32 BOWEL AND BLADDER INCONTINENCE: ICD-10-CM

## 2024-03-05 DIAGNOSIS — I82.501 CHRONIC DEEP VEIN THROMBOSIS (DVT) OF RIGHT LOWER EXTREMITY, UNSPECIFIED VEIN (HCC): ICD-10-CM

## 2024-03-05 DIAGNOSIS — F02.80 LATE ONSET ALZHEIMER'S DISEASE WITHOUT BEHAVIORAL DISTURBANCE (HCC): Primary | ICD-10-CM

## 2024-03-05 DIAGNOSIS — F45.8 BRUXISM (TEETH GRINDING): ICD-10-CM

## 2024-03-05 DIAGNOSIS — M62.838 MUSCLE SPASM: ICD-10-CM

## 2024-03-05 DIAGNOSIS — E03.8 OTHER SPECIFIED HYPOTHYROIDISM: ICD-10-CM

## 2024-03-05 DIAGNOSIS — G30.1 LATE ONSET ALZHEIMER'S DISEASE WITHOUT BEHAVIORAL DISTURBANCE (HCC): Primary | ICD-10-CM

## 2024-03-05 DIAGNOSIS — R53.81 DEBILITY: ICD-10-CM

## 2024-03-05 DIAGNOSIS — R13.19 OTHER DYSPHAGIA: ICD-10-CM

## 2024-03-05 DIAGNOSIS — R15.9 BOWEL AND BLADDER INCONTINENCE: ICD-10-CM

## 2024-03-05 PROBLEM — K05.6 PERIODONTAL DISEASE: Status: RESOLVED | Noted: 2019-08-19 | Resolved: 2024-03-05

## 2024-03-05 PROBLEM — R63.0 POOR APPETITE: Status: RESOLVED | Noted: 2022-03-22 | Resolved: 2024-03-05

## 2024-03-05 PROCEDURE — 99309 SBSQ NF CARE MODERATE MDM 30: CPT | Performed by: FAMILY MEDICINE

## 2024-03-05 NOTE — PROGRESS NOTES
[facility]  [address] [address]  [phone]  [facility] [address]      NAME: Lucrecia Morales  AGE: 89 y.o. SEX: female [phone]    DATE OF ENCOUNTER: 3/5/2024    Assessment and Plan     Bruxism (teeth grinding)  Continues with bruxism  Continue scheduled liquid Tylenol, baclofen, and gabapentin    Dysphagia  Continue dysphagia 1 diet  Staff and family assist with feeding  Family provides baby food if patient does not eat her meals at facility  Weight loss 154.6 lbs in Dec to 150.2 in February    Hypothyroidism  Continue levothyroxine 50 mcg daily  No further blood work as per daughter's wishes    Chronic deep vein thrombosis (DVT) (HCC)  Stable on Xarelto for anticoagulation    Late onset Alzheimer's disease without behavioral disturbance (HCC)  With progressive decline and requires max to complete assist with all ADLs  Transfers with Amisha lift  Continue 24/7 care and support at long-term care facility for ADLs; IADLs  Continue delirium precautions  Family is very supportive  Continue RNP program to prevent deconditioning    Bowel and bladder incontinence  Schedule toileting, repositioning    Debility  Multifactorial in setting of chronic medical conditions, dementia  Continue supportive care at long-term care facility  incontinence care  Skin breakdown prevention measures in place by nursing staff    Muscle spasm  Maintain on baclofen 10 mg daily    Chief Complaint     Routine Long term follow-up visit.    History of Present Illness     HPI  The patient is seen in her room during routine LTC visit. She is lying in recliner, no signs of pain noted. No behavior disturbances reported from nursing staff.    The following portions of the patient's history were reviewed and updated as appropriate: allergies, current medications, past family history, past medical history, past social history, past surgical history and problem list.    Review of Systems     Review of Systems   Unable to  perform ROS: Dementia     Active Problem List     Patient Active Problem List   Diagnosis    Debility    Late onset Alzheimer's disease without behavioral disturbance (HCC)    Hypothyroidism    Chronic deep vein thrombosis (DVT) (HCC)    Periodontal disease    Dysphagia    Muscle spasm    Bowel and bladder incontinence    Other constipation    Poor appetite    Bruxism (teeth grinding)     Objective     Vitals:    03/05/24 0919   BP: 111/69   Pulse: 91   Resp: 18   Temp: 97.7 °F (36.5 °C)   SpO2: 96%   Weight: 68.1 kg (150 lb 3.2 oz)     Physical Exam  Constitutional:       General: She is not in acute distress.  HENT:      Head: Normocephalic and atraumatic.      Nose: Nose normal.      Mouth/Throat:      Mouth: Mucous membranes are moist.   Eyes:      General:         Right eye: No discharge.         Left eye: No discharge.      Conjunctiva/sclera: Conjunctivae normal.   Cardiovascular:      Rate and Rhythm: Normal rate and regular rhythm.   Pulmonary:      Effort: Pulmonary effort is normal. No respiratory distress.   Abdominal:      General: Bowel sounds are normal.   Musculoskeletal:      Right lower leg: No edema.      Left lower leg: No edema.   Neurological:      Mental Status: She is alert.      Comments: Nonverbal, no eye contact  Does not answer questions nor follow commands       Pertinent Laboratory/Diagnostic Studies:  Refer to facility chart.    Current Medications   Medications reviewed and updated in facility chart.

## 2024-03-05 NOTE — ASSESSMENT & PLAN NOTE
Continue dysphagia 1 diet  Staff and family assist with feeding  Family provides baby food if patient does not eat her meals at facility  Weight loss 154.6 lbs in Dec to 150.2 in February

## 2024-04-04 ENCOUNTER — NURSING HOME VISIT (OUTPATIENT)
Dept: GERIATRICS | Facility: OTHER | Age: 89
End: 2024-04-04
Payer: MEDICARE

## 2024-04-04 DIAGNOSIS — R53.81 DEBILITY: ICD-10-CM

## 2024-04-04 DIAGNOSIS — F45.8 BRUXISM (TEETH GRINDING): ICD-10-CM

## 2024-04-04 DIAGNOSIS — G30.1 LATE ONSET ALZHEIMER'S DISEASE WITHOUT BEHAVIORAL DISTURBANCE (HCC): Primary | ICD-10-CM

## 2024-04-04 DIAGNOSIS — R15.9 BOWEL AND BLADDER INCONTINENCE: ICD-10-CM

## 2024-04-04 DIAGNOSIS — R13.19 OTHER DYSPHAGIA: ICD-10-CM

## 2024-04-04 DIAGNOSIS — M62.838 MUSCLE SPASM: ICD-10-CM

## 2024-04-04 DIAGNOSIS — R32 BOWEL AND BLADDER INCONTINENCE: ICD-10-CM

## 2024-04-04 DIAGNOSIS — F02.80 LATE ONSET ALZHEIMER'S DISEASE WITHOUT BEHAVIORAL DISTURBANCE (HCC): Primary | ICD-10-CM

## 2024-04-04 DIAGNOSIS — I82.501 CHRONIC DEEP VEIN THROMBOSIS (DVT) OF RIGHT LOWER EXTREMITY, UNSPECIFIED VEIN (HCC): ICD-10-CM

## 2024-04-04 DIAGNOSIS — E03.8 OTHER SPECIFIED HYPOTHYROIDISM: ICD-10-CM

## 2024-04-04 PROCEDURE — 99309 SBSQ NF CARE MODERATE MDM 30: CPT | Performed by: FAMILY MEDICINE

## 2024-04-04 NOTE — PROGRESS NOTES
[facility]  [address] [address]  [phone]  [facility] POS [address]      NAME: Lucrecia Morales  AGE: 89 y.o. SEX: female [phone]    DATE OF ENCOUNTER: 4/4/2024    Assessment and Plan     Late onset Alzheimer's disease without behavioral disturbance (HCC)  With progressive decline and requires max to complete assist with all ADLs  Transfers with Amisha lift  Continue 24/7 care and support at long-term care facility for ADLs; IADLs  Continue delirium precautions  Family is very supportive  Continue RNP program to prevent deconditioning    Chronic deep vein thrombosis (DVT) (HCC)  Stable on Xarelto for anticoagulation    Bruxism (teeth grinding)  Continues with bruxism  Continue scheduled liquid Tylenol, baclofen, and gabapentin    Dysphagia  Continue dysphagia 1 diet  Staff and family assist with feeding  Family provides baby food if patient does not eat her meals at facility    Hypothyroidism  Continue levothyroxine 50 mcg daily  No further blood work as per daughter's wishes    Bowel and bladder incontinence  Schedule toileting, repositioning    Muscle spasm  Maintain on baclofen 10 mg daily    Debility  Multifactorial in setting of chronic medical conditions, dementia  Continue supportive care at long-term care facility  incontinence care  Skin breakdown prevention measures in place by nursing staff      Chief Complaint     Routine Long term follow-up visit.    History of Present Illness     The patient is seen during routine LTC follow-up visit.  No concern from nursing staff.    The following portions of the patient's history were reviewed and updated as appropriate: allergies, current medications, past family history, past medical history, past social history, past surgical history and problem list.    Review of Systems     Review of Systems   Unable to perform ROS: Patient nonverbal     Active Problem List     Patient Active Problem List   Diagnosis    Debility    Late onset  Alzheimer's disease without behavioral disturbance (HCC)    Hypothyroidism    Chronic deep vein thrombosis (DVT) (HCC)    Dysphagia    Muscle spasm    Bowel and bladder incontinence    Other constipation    Bruxism (teeth grinding)     Objective     Vitals:EHR at facility reviewed, stable.    Physical Exam  Constitutional:       General: She is not in acute distress.  HENT:      Head: Normocephalic.      Nose: Nose normal.      Mouth/Throat:      Mouth: Mucous membranes are moist.      Comments: Poor dentition  Eyes:      General:         Right eye: No discharge.         Left eye: No discharge.      Conjunctiva/sclera: Conjunctivae normal.   Cardiovascular:      Rate and Rhythm: Normal rate and regular rhythm.      Pulses: Normal pulses.      Heart sounds: Normal heart sounds.   Pulmonary:      Effort: Pulmonary effort is normal. No respiratory distress.      Breath sounds: Normal breath sounds.   Abdominal:      General: Bowel sounds are normal. There is no distension.   Skin:     General: Skin is warm.   Neurological:      Mental Status: She is alert.      Comments: Lying in recliner, no eye contact. Nonverbal       Pertinent Laboratory/Diagnostic Studies:  Refer to facility chart.    Current Medications   Medications reviewed and updated in facility chart.

## 2024-04-22 NOTE — ASSESSMENT & PLAN NOTE
Continue dysphagia 1 diet  Staff and family assist with feeding  Family provides baby food if patient does not eat her meals at facility

## 2024-05-06 ENCOUNTER — NURSING HOME VISIT (OUTPATIENT)
Dept: GERIATRICS | Facility: OTHER | Age: 89
End: 2024-05-06
Payer: MEDICARE

## 2024-05-06 DIAGNOSIS — G30.1 LATE ONSET ALZHEIMER'S DISEASE WITHOUT BEHAVIORAL DISTURBANCE (HCC): Primary | ICD-10-CM

## 2024-05-06 DIAGNOSIS — R13.19 OTHER DYSPHAGIA: ICD-10-CM

## 2024-05-06 DIAGNOSIS — M62.838 MUSCLE SPASM: ICD-10-CM

## 2024-05-06 DIAGNOSIS — R32 BOWEL AND BLADDER INCONTINENCE: ICD-10-CM

## 2024-05-06 DIAGNOSIS — R53.81 DEBILITY: ICD-10-CM

## 2024-05-06 DIAGNOSIS — F45.8 BRUXISM (TEETH GRINDING): ICD-10-CM

## 2024-05-06 DIAGNOSIS — R15.9 BOWEL AND BLADDER INCONTINENCE: ICD-10-CM

## 2024-05-06 DIAGNOSIS — R13.10 DYSPHAGIA, UNSPECIFIED TYPE: ICD-10-CM

## 2024-05-06 DIAGNOSIS — F02.80 LATE ONSET ALZHEIMER'S DISEASE WITHOUT BEHAVIORAL DISTURBANCE (HCC): Primary | ICD-10-CM

## 2024-05-06 DIAGNOSIS — I82.501 CHRONIC DEEP VEIN THROMBOSIS (DVT) OF RIGHT LOWER EXTREMITY, UNSPECIFIED VEIN (HCC): ICD-10-CM

## 2024-05-06 DIAGNOSIS — E03.8 OTHER SPECIFIED HYPOTHYROIDISM: ICD-10-CM

## 2024-05-06 PROCEDURE — 99309 SBSQ NF CARE MODERATE MDM 30: CPT | Performed by: FAMILY MEDICINE

## 2024-05-06 NOTE — PROGRESS NOTES
[facility]  [address] [address]  [phone]  [facility] [address]    NAME: Lucrecia Morales  AGE: 89 y.o. SEX: female [phone]    DATE OF ENCOUNTER: 5/6/2024    Assessment and Plan     Late onset Alzheimer's disease without behavioral disturbance (HCC)  With progressive decline and requires max to complete assist with all ADLs  Transfers with Amisha lift  Continue 24/7 care and support at long-term care facility for ADLs; IADLs  Continue delirium precautions  Family is very supportive  Continue RNP program to prevent deconditioning     Chronic deep vein thrombosis (DVT) (HCC)  Stable on Xarelto for anticoagulation     Bruxism (teeth grinding)  Continues with bruxism  Continue scheduled liquid Tylenol, baclofen, and gabapentin     Dysphagia  Continue dysphagia 1 diet  Staff and family assist with feeding  Family provides baby food if patient does not eat her meals at facility     Hypothyroidism  Continue levothyroxine 50 mcg daily  No further blood work as per daughter's wishes     Bowel and bladder incontinence  Schedule toileting, repositioning     Muscle spasm  Maintain on baclofen 10 mg daily     Debility  Multifactorial in setting of chronic medical conditions, dementia  Continue supportive care at long-term care facility  incontinence care  Skin breakdown prevention measures in place by nursing staff      Chief Complaint     Routine Long term follow-up visit.    History of Present Illness     The patient is seen in her room, no sign of pain or discomfort noted. No concern from nursing staff.    The following portions of the patient's history were reviewed and updated as appropriate: allergies, current medications, past family history, past medical history, past social history, past surgical history and problem list.    Review of Systems     Review of Systems   Unable to perform ROS: Dementia     Objective     Vitals: EHR at facility reviewed, stable.    Physical Exam  General:  NAD  Head: normocephalic  Heart: RRR, no M/R/G  Lungs: CTA. No W/R/R  Abdomen: soft, nondistended, nontender, BS +  Ext: no edema  Neuro: nonverbal. No eye contact.    Pertinent Laboratory/Diagnostic Studies:  Refer to facility chart.    Current Medications   Medications reviewed and updated in facility chart.

## 2024-06-14 ENCOUNTER — NURSE TRIAGE (OUTPATIENT)
Dept: OTHER | Facility: OTHER | Age: 89
End: 2024-06-14

## 2024-06-14 ENCOUNTER — NURSING HOME VISIT (OUTPATIENT)
Dept: GERIATRICS | Facility: OTHER | Age: 89
End: 2024-06-14
Payer: MEDICARE

## 2024-06-14 DIAGNOSIS — R32 BOWEL AND BLADDER INCONTINENCE: ICD-10-CM

## 2024-06-14 DIAGNOSIS — F02.80 LATE ONSET ALZHEIMER'S DISEASE WITHOUT BEHAVIORAL DISTURBANCE (HCC): Primary | ICD-10-CM

## 2024-06-14 DIAGNOSIS — R53.81 DEBILITY: ICD-10-CM

## 2024-06-14 DIAGNOSIS — E03.8 OTHER SPECIFIED HYPOTHYROIDISM: ICD-10-CM

## 2024-06-14 DIAGNOSIS — R13.19 OTHER DYSPHAGIA: ICD-10-CM

## 2024-06-14 DIAGNOSIS — I82.501 CHRONIC DEEP VEIN THROMBOSIS (DVT) OF RIGHT LOWER EXTREMITY, UNSPECIFIED VEIN (HCC): ICD-10-CM

## 2024-06-14 DIAGNOSIS — R15.9 BOWEL AND BLADDER INCONTINENCE: ICD-10-CM

## 2024-06-14 DIAGNOSIS — G30.1 LATE ONSET ALZHEIMER'S DISEASE WITHOUT BEHAVIORAL DISTURBANCE (HCC): Primary | ICD-10-CM

## 2024-06-14 DIAGNOSIS — F45.8 BRUXISM (TEETH GRINDING): ICD-10-CM

## 2024-06-14 PROCEDURE — 99309 SBSQ NF CARE MODERATE MDM 30: CPT | Performed by: FAMILY MEDICINE

## 2024-06-14 NOTE — PROGRESS NOTES
[facility]  [address] [id number] [phone]  [facility]  [address]      NAME: Lucrecia Morales  AGE: 89 y.o. SEX: female [phone]    DATE OF ENCOUNTER: 6/14/2024    Assessment and Plan     Late onset Alzheimer's disease without behavioral disturbance (HCC)  With progressive decline and requires max to complete assist with all ADLs  Transfers with Amisha lift  Continue 24/7 care and support at long-term care facility for ADLs; IADLs  Continue delirium precautions  Family is very supportive  Continue RNP program to prevent deconditioning    Chronic deep vein thrombosis (DVT) (HCC)  Continue Xarelto for anticoagulation    Bruxism (teeth grinding)  Continues with bruxism  Continue scheduled liquid Tylenol, baclofen, and gabapentin    Hypothyroidism  Continue levothyroxine 50 mcg daily  No further blood work as per daughter's wishes    Bowel and bladder incontinence  Schedule toileting, repositioning    Debility  Multifactorial in setting of chronic medical conditions, dementia  Continue supportive care at long-term care facility  incontinence care  Skin breakdown prevention measures in place by nursing staff      Chief Complaint     Routine Long term follow-up visit.    History of Present Illness   The patient is seen in her room during routine LTC visit. She is sitting in wheelchair. No behavior disturbances reported from nursing staff.     The following portions of the patient's history were reviewed and updated as appropriate: allergies, current medications, past family history, past medical history, past social history, past surgical history and problem list.    Review of Systems     Review of Systems   Unable to perform ROS: Dementia     Active Problem List     Patient Active Problem List   Diagnosis    Debility    Late onset Alzheimer's disease without behavioral disturbance (HCC)    Hypothyroidism    Chronic deep vein thrombosis (DVT) (HCC)    Dysphagia    Muscle spasm    Bowel  and bladder incontinence    Other constipation    Bruxism (teeth grinding)       Objective     Vitals: EHR at facility reviewed, stable.    Physical Exam  Vitals and nursing note reviewed.   General: no acute distress.  HENT:      Head: Normocephalic.      Nose: Nose normal.      Mouth: Mucous membranes are moist.   Eyes:       Right eye: No discharge.         Left eye: No discharge.      Conjunctivae normal.   Cardiovascular:      Normal rate and regular rhythm.   Pulmonary:      Pulmonary effort is normal. No wheezing, rhonchi or rales.   Abdominal:      Bowel sounds are normal. There is no distension.      Abdomen is soft.   Musculoskeletal:         Right lower leg: No edema.      Left lower leg: No edema.   Skin:     General: Skin is warm.   Neurological: alert.      Nonverbal    Pertinent Laboratory/Diagnostic Studies:  Refer to facility chart.    Current Medications   Medications reviewed and updated in facility chart.

## 2024-06-14 NOTE — TELEPHONE ENCOUNTER
Kerri from [facility] called to notify of abrasion on right heel.     Call back [phone] and there will be a prompt to connect to .    Read receipt noted.  Reason for Disposition  • Affirmative: Did you page the on call provider?    Protocols used: BRUNO NO TRIAGE REQUIRED

## 2024-07-15 ENCOUNTER — NURSING HOME VISIT (OUTPATIENT)
Dept: GERIATRICS | Facility: OTHER | Age: 89
End: 2024-07-15
Payer: MEDICARE

## 2024-07-15 DIAGNOSIS — G30.1 LATE ONSET ALZHEIMER'S DISEASE WITHOUT BEHAVIORAL DISTURBANCE (HCC): Primary | ICD-10-CM

## 2024-07-15 DIAGNOSIS — M62.838 MUSCLE SPASM: ICD-10-CM

## 2024-07-15 DIAGNOSIS — F02.80 LATE ONSET ALZHEIMER'S DISEASE WITHOUT BEHAVIORAL DISTURBANCE (HCC): Primary | ICD-10-CM

## 2024-07-15 DIAGNOSIS — R13.19 OTHER DYSPHAGIA: ICD-10-CM

## 2024-07-15 DIAGNOSIS — I82.501 CHRONIC DEEP VEIN THROMBOSIS (DVT) OF RIGHT LOWER EXTREMITY, UNSPECIFIED VEIN (HCC): ICD-10-CM

## 2024-07-15 DIAGNOSIS — R32 BOWEL AND BLADDER INCONTINENCE: ICD-10-CM

## 2024-07-15 DIAGNOSIS — E03.8 OTHER SPECIFIED HYPOTHYROIDISM: ICD-10-CM

## 2024-07-15 DIAGNOSIS — R15.9 BOWEL AND BLADDER INCONTINENCE: ICD-10-CM

## 2024-07-15 PROCEDURE — 99309 SBSQ NF CARE MODERATE MDM 30: CPT | Performed by: FAMILY MEDICINE

## 2024-07-15 NOTE — PROGRESS NOTES
[facility]  [address] [address] [phone]  [facility]  [address]    NAME: Lucrecia Morales  AGE: 89 y.o. SEX: female [phone]    DATE OF ENCOUNTER: 7/15/2024    Assessment and Plan     Late onset Alzheimer's disease without behavioral disturbance (HCC)  With progressive decline and requires max to complete assist with all ADLs  Transfers with Amisha lift  Continue 24/7 care and support at long-term care facility for ADLs; IADLs  Continue delirium precautions  Family is very supportive  Continue RNP program to prevent deconditioning    Chronic deep vein thrombosis (DVT) (HCC)  Continue Xarelto for anticoagulation    Dysphagia  Continue dysphagia 1 diet, thin liquid  Staff and family assist with feeding  Family provides baby food if patient does not eat her meals at facility    Hypothyroidism  Continue levothyroxine 50 mcg daily  No further blood work as per daughter's wishes    Muscle spasm  Maintain on baclofen 10 mg daily    Chief Complaint     Routine Long term follow-up visit.    History of Present Illness   The patient is seen in the room during routine LTC follow up visit. She is sitting in her chair, looking at the bird feeder outside the window from her room. No concerns from nursing staff.    The following portions of the patient's history were reviewed and updated as appropriate: allergies, current medications, past family history, past medical history, past social history, past surgical history and problem list.    Review of Systems     Review of Systems   Unable to perform ROS: Dementia     Active Problem List     Patient Active Problem List   Diagnosis    Debility    Late onset Alzheimer's disease without behavioral disturbance (HCC)    Hypothyroidism    Chronic deep vein thrombosis (DVT) (HCC)    Dysphagia    Muscle spasm    Bowel and bladder incontinence    Other constipation    Bruxism (teeth grinding)       Objective     Vitals: EHR at facility reviewed,  stable.  Nursing note reviewed.   General: no acute distress.  HENT:      Head: Normocephalic.      Nose: Nose normal.      Mouth: Mucous membranes are moist.   Eyes:       Right eye: No discharge.         Left eye: No discharge.      Conjunctivae normal.   Cardiovascular:      Normal rate and regular rhythm. No murmur heard.  Pulmonary:      Pulmonary effort is normal. Diminished breath sounds b/l  Abdominal:      Bowel sounds are normal. There is no distension.   Musculoskeletal:      Right lower leg: No edema.      Left lower leg: No edema.     B/l LE in Prevalon boots  Skin:     General: Skin is warm.   Neurological: alert.   Pleasantly confused. Does not answer or follow commands    Pertinent Laboratory/Diagnostic Studies:  Refer to facility chart.    Current Medications   Medications reviewed and updated in facility chart.

## 2024-07-17 NOTE — ASSESSMENT & PLAN NOTE
Continue dysphagia 1 diet, thin liquid  Staff and family assist with feeding  Family provides baby food if patient does not eat her meals at facility

## 2024-08-15 ENCOUNTER — NURSING HOME VISIT (OUTPATIENT)
Dept: GERIATRICS | Facility: OTHER | Age: 89
End: 2024-08-15
Payer: MEDICARE

## 2024-08-15 DIAGNOSIS — R13.19 OTHER DYSPHAGIA: ICD-10-CM

## 2024-08-15 DIAGNOSIS — G30.1 LATE ONSET ALZHEIMER'S DISEASE WITHOUT BEHAVIORAL DISTURBANCE (HCC): Primary | ICD-10-CM

## 2024-08-15 DIAGNOSIS — M62.838 MUSCLE SPASM: ICD-10-CM

## 2024-08-15 DIAGNOSIS — F02.80 LATE ONSET ALZHEIMER'S DISEASE WITHOUT BEHAVIORAL DISTURBANCE (HCC): Primary | ICD-10-CM

## 2024-08-15 DIAGNOSIS — R15.9 BOWEL AND BLADDER INCONTINENCE: ICD-10-CM

## 2024-08-15 DIAGNOSIS — E03.8 OTHER SPECIFIED HYPOTHYROIDISM: ICD-10-CM

## 2024-08-15 DIAGNOSIS — F45.8 BRUXISM (TEETH GRINDING): ICD-10-CM

## 2024-08-15 DIAGNOSIS — I82.501 CHRONIC DEEP VEIN THROMBOSIS (DVT) OF RIGHT LOWER EXTREMITY, UNSPECIFIED VEIN (HCC): ICD-10-CM

## 2024-08-15 DIAGNOSIS — R32 BOWEL AND BLADDER INCONTINENCE: ICD-10-CM

## 2024-08-15 PROCEDURE — 99309 SBSQ NF CARE MODERATE MDM 30: CPT | Performed by: FAMILY MEDICINE

## 2024-08-15 NOTE — PROGRESS NOTES
[facility]  [address] [address] [phone]  [facility]  POS [address]      NAME: Lucrecia Morales  AGE: 89 y.o. SEX: female [phone]    DATE OF ENCOUNTER: 8/15/2024    Assessment and Plan     Late onset Alzheimer's disease without behavioral disturbance (HCC)  With progressive decline and requires max to complete assist with all ADLs  Transfers with Amisha lift  Continue 24/7 care and support at long-term care facility for ADLs; IADLs  Continue delirium precautions  Family is very supportive  Continue toileting schedule for urinary and bowel incontinence.     Chronic deep vein thrombosis (DVT) (HCC)  Continue Xarelto for anticoagulation.    Bruxism (teeth grinding)  Continues with bruxism  Continue scheduled liquid Tylenol, baclofen, and gabapentin    Dysphagia  Continue dysphagia 1 - puree diet, thin liquid  Staff and family assist with feeding  Family provides baby food if patient does not eat her meals at facility    Hypothyroidism  Continue levothyroxine 50 mcg daily  No further blood work as per daughter's wishes    Bowel and bladder incontinence  Schedule toileting, repositioning    Muscle spasm  Maintain on baclofen 10 mg twice daily      Chief Complaint     Routine Long term follow-up visit.    History of Present Illness   The patient is seen in the room during routine LTC follow up visit. She is sitting in recliner, looking at the bird feeder outside her room, appears comfortable.  She didn't answer or maintain eye contact to me. No concerns from nursing staff.     The following portions of the patient's history were reviewed and updated as appropriate: allergies, current medications, past family history, past medical history, past social history, past surgical history and problem list.    Review of Systems     Review of Systems   Unable to perform ROS: Dementia     Active Problem List     Patient Active Problem List   Diagnosis    Debility    Late onset Alzheimer's disease  without behavioral disturbance (HCC)    Hypothyroidism    Chronic deep vein thrombosis (DVT) (HCC)    Dysphagia    Muscle spasm    Bowel and bladder incontinence    Other constipation    Bruxism (teeth grinding)       Objective     Vitals: EHR at facility reviewed, stable.  Nursing note reviewed.   General: no acute distress.  HENT:      Head: Normocephalic.      Nose: Nose normal.      Mouth: Mucous membranes are moist.   Eyes:       Right eye: No discharge.         Left eye: No discharge.      Conjunctivae normal.   Cardiovascular:      Normal rate and regular rhythm.   Pulmonary:      Pulmonary effort is normal.   Abdominal:      Bowel sounds are normal. There is no distension.      Abdomen is soft. There is no abdominal tenderness.   Musculoskeletal:      Right lower leg: No edema.      Left lower leg: No edema.      Feet in Prevalon boots  Skin:     General: Skin is warm.   Neurological: alert.      Nonverbal. No eye contact.      Current Medications   Medications reviewed and updated in facility chart.

## 2024-08-18 RX ORDER — ACETAMINOPHEN 160 MG/5ML
20.3 LIQUID ORAL 3 TIMES DAILY
COMMUNITY

## 2024-08-18 NOTE — ASSESSMENT & PLAN NOTE
With progressive decline and requires max to complete assist with all ADLs  Transfers with Maisha lift  Continue 24/7 care and support at long-term care facility for ADLs; IADLs  Continue delirium precautions  Family is very supportive  Continue toileting schedule for urinary and bowel incontinence.

## 2024-08-18 NOTE — ASSESSMENT & PLAN NOTE
Continue dysphagia 1 - puree diet, thin liquid  Staff and family assist with feeding  Family provides baby food if patient does not eat her meals at facility

## 2024-09-24 ENCOUNTER — NURSING HOME VISIT (OUTPATIENT)
Dept: GERIATRICS | Facility: OTHER | Age: 89
End: 2024-09-24
Payer: MEDICARE

## 2024-09-24 DIAGNOSIS — G30.1 LATE ONSET ALZHEIMER'S DISEASE WITHOUT BEHAVIORAL DISTURBANCE (HCC): Primary | ICD-10-CM

## 2024-09-24 DIAGNOSIS — R13.19 OTHER DYSPHAGIA: ICD-10-CM

## 2024-09-24 DIAGNOSIS — M62.838 MUSCLE SPASM: ICD-10-CM

## 2024-09-24 DIAGNOSIS — F45.8 BRUXISM (TEETH GRINDING): ICD-10-CM

## 2024-09-24 DIAGNOSIS — I82.501 CHRONIC DEEP VEIN THROMBOSIS (DVT) OF RIGHT LOWER EXTREMITY, UNSPECIFIED VEIN (HCC): ICD-10-CM

## 2024-09-24 DIAGNOSIS — F02.80 LATE ONSET ALZHEIMER'S DISEASE WITHOUT BEHAVIORAL DISTURBANCE (HCC): Primary | ICD-10-CM

## 2024-09-24 DIAGNOSIS — E03.8 OTHER SPECIFIED HYPOTHYROIDISM: ICD-10-CM

## 2024-09-24 DIAGNOSIS — K59.09 OTHER CONSTIPATION: ICD-10-CM

## 2024-09-24 PROCEDURE — 99309 SBSQ NF CARE MODERATE MDM 30: CPT | Performed by: FAMILY MEDICINE

## 2024-09-24 NOTE — PROGRESS NOTES
[facility]  [address] [address] [phone]  [facility]  [address]    NAME: Lucrecia Morales  AGE: 89 y.o. SEX: female [phone]  DATE OF ENCOUNTER: 9/24/2024  Assessment and Plan   Late onset Alzheimer's disease without behavioral disturbance (HCC)  With progressive decline and requires max to complete assist with all ADLs  Transfers with Amisha lift. Functional assessment staging 7  Continue 24/7 care and support at long-term care facility for ADLs; IADLs  Continue delirium precautions  Family is very supportive  Continue toileting schedule for urinary and bowel incontinence.     Chronic deep vein thrombosis (DVT) (HCC)  Continue Xarelto for anticoagulation.    Bruxism (teeth grinding)  Continues with bruxism  Continue scheduled liquid Tylenol, baclofen, and gabapentin    Dysphagia  Continue dysphagia 1 - puree diet, thin liquid  Staff and family assist with feeding  Family provides baby food if patient does not eat her meals at facility    Hypothyroidism  Continue levothyroxine 50 mcg daily  No further blood work as per daughter's wishes    Muscle spasm  Maintain on baclofen 10 mg twice daily    Other constipation  Continue senna S two p.o. daily  Continue Dulcolax suppository and MiraLax p.r.n.    Chief Complaint   Routine Long term follow-up visit.  History of Present Illness   The patient is seen in the room during routine LTC follow up visit. She is sitting in recliner, mumbling incomprehensive words. She smiles when daughter comes and taker her outside to the front of the building. No concerns from nursing staff.    The following portions of the patient's history were reviewed and updated as appropriate: allergies, current medications, past family history, past medical history, past social history, past surgical history and problem list.  Review of Systems   Review of Systems   Unable to perform ROS: Dementia     Active Problem List     Patient Active Problem List   Diagnosis     Debility    Late onset Alzheimer's disease without behavioral disturbance (HCC)    Hypothyroidism    Chronic deep vein thrombosis (DVT) (HCC)    Dysphagia    Muscle spasm    Bowel and bladder incontinence    Other constipation    Bruxism (teeth grinding)     Objective     Vitals: EHR at facility reviewed, stable.  Nursing note reviewed.   General: no acute distress.  HENT:      Head: Normocephalic.      Nose: Nose normal.      Mouth: Mucous membranes are moist.   Eyes:       Right eye: No discharge.         Left eye: No discharge.      Conjunctivae normal.   Cardiovascular:      Normal rate and regular rhythm. No murmur heard.  Pulmonary:      Pulmonary effort is normal. No wheezing, rhonchi or rales.   Abdominal:      Bowel sounds are normal. There is no distension.      Abdomen is soft. There is no abdominal tenderness.   Musculoskeletal:      Right lower leg: No edema.      Left lower leg: No edema.   Skin:     General: Skin is warm.   Neurological: alert.      No eye contact. Does not answer or follow commands        Pertinent Laboratory/Diagnostic Studies:  Refer to facility chart.    Current Medications   Medications reviewed and updated in facility chart.

## 2024-10-08 NOTE — ASSESSMENT & PLAN NOTE
With progressive decline and requires max to complete assist with all ADLs  Transfers with Amisha lift. Functional assessment staging 7  Continue 24/7 care and support at long-term care facility for ADLs; IADLs  Continue delirium precautions  Family is very supportive  Continue toileting schedule for urinary and bowel incontinence.

## 2024-10-29 ENCOUNTER — NURSING HOME VISIT (OUTPATIENT)
Dept: GERIATRICS | Facility: OTHER | Age: 89
End: 2024-10-29
Payer: MEDICARE

## 2024-10-29 DIAGNOSIS — I82.501 CHRONIC DEEP VEIN THROMBOSIS (DVT) OF RIGHT LOWER EXTREMITY, UNSPECIFIED VEIN (HCC): ICD-10-CM

## 2024-10-29 DIAGNOSIS — K59.09 OTHER CONSTIPATION: ICD-10-CM

## 2024-10-29 DIAGNOSIS — F45.8 BRUXISM (TEETH GRINDING): ICD-10-CM

## 2024-10-29 DIAGNOSIS — F02.80 LATE ONSET ALZHEIMER'S DISEASE WITHOUT BEHAVIORAL DISTURBANCE (HCC): Primary | ICD-10-CM

## 2024-10-29 DIAGNOSIS — M62.838 MUSCLE SPASM: ICD-10-CM

## 2024-10-29 DIAGNOSIS — G30.1 LATE ONSET ALZHEIMER'S DISEASE WITHOUT BEHAVIORAL DISTURBANCE (HCC): Primary | ICD-10-CM

## 2024-10-29 DIAGNOSIS — E03.8 OTHER SPECIFIED HYPOTHYROIDISM: ICD-10-CM

## 2024-10-29 DIAGNOSIS — R13.19 OTHER DYSPHAGIA: ICD-10-CM

## 2024-10-29 PROCEDURE — 99309 SBSQ NF CARE MODERATE MDM 30: CPT | Performed by: FAMILY MEDICINE

## 2024-10-29 NOTE — PROGRESS NOTES
[facility]  [address] [address]  [phone]  Facility: [facility]  POS 32    NAME: Lucrecia Morales  AGE: 89 y.o. SEX: female [phone]  DATE OF ENCOUNTER: 10/29/2024    Assessment and Plan   Late onset Alzheimer's disease without behavioral disturbance (HCC)  With progressive decline and requires max to complete assist with all ADLs  Transfers with Amisha lift. Functional assessment staging 7  Continue 24/7 care and support at long-term care facility for ADLs; IADLs  Continue delirium precautions    Chronic deep vein thrombosis (DVT) (HCC)  Continue Xarelto for anticoagulation.    Bruxism (teeth grinding)  Continues with bruxism  Continue scheduled liquid Tylenol, baclofen, and gabapentin    Dysphagia  Continue dysphagia 1 - puree diet, thin liquid  Staff and family assist with feeding  Family provides baby food if patient does not eat her meals at facility    Hypothyroidism  Continue levothyroxine 50 mcg daily  No further blood work as per daughter's wishes    Muscle spasm  Maintained on baclofen 10 mg twice daily    Other constipation  Continue senna S p.o. daily  Continue Dulcolax suppository and MiraLax p.r.n.    Chief Complaint   Routine Long term follow-up visit.  History of Present Illness   The patient is seen in the room during routine LTC follow up visit. Her daughter usually wheels her to the front entrance for sunbathing. Per nursing report, she enjoys family visiting, listening to music, watching birds through window from her room. No other concerns.     The following portions of the patient's history were reviewed and updated as appropriate: allergies, current medications, past family history, past medical history, past social history, past surgical history and problem list.    Review of Systems     Review of Systems   Unable to perform ROS: Patient nonverbal     Active Problem List     Patient Active Problem List   Diagnosis    Debility    Late onset Alzheimer's  disease without behavioral disturbance (HCC)    Hypothyroidism    Chronic deep vein thrombosis (DVT) (HCC)    Dysphagia    Muscle spasm    Bowel and bladder incontinence    Other constipation    Bruxism (teeth grinding)     Objective     Vitals: EHR at facility reviewed, stable.  Nursing note reviewed.   General: no acute distress.  HENT:      Head: Normocephalic.      Nose: Nose normal.      Mouth: Mucous membranes are moist.   Eyes:       Right eye: No discharge.         Left eye: No discharge.      Conjunctivae normal.   Cardiovascular:      Normal rate and regular rhythm. No murmur heard.  Pulmonary:      Pulmonary effort is normal. No wheezing, rhonchi or rales.   Abdominal:      Bowel sounds are normal. There is no distension.      Abdomen is soft. There is no abdominal tenderness.   Musculoskeletal:      Right lower leg: No edema.      Left lower leg: No edema.   Skin:     General: Skin is warm.   Neurological: alert.      Oriented to person only. Respond to name. Does not follow commands      Behavior: normal.     Pertinent Laboratory/Diagnostic Studies:  Refer to facility chart.    Current Medications   Medications reviewed and updated in facility chart.

## 2024-11-18 NOTE — ASSESSMENT & PLAN NOTE
With progressive decline and requires max to complete assist with all ADLs  Transfers with Amisha lift. Functional assessment staging 7  Continue 24/7 care and support at long-term care facility for ADLs; IADLs  Continue delirium precautions

## 2024-12-10 ENCOUNTER — NURSING HOME VISIT (OUTPATIENT)
Dept: WOUND CARE | Facility: HOSPITAL | Age: 89
End: 2024-12-10
Payer: MEDICARE

## 2024-12-10 DIAGNOSIS — L25.8 DERMATITIS ASSOCIATED WITH INCONTINENCE: Primary | ICD-10-CM

## 2024-12-10 DIAGNOSIS — G30.1 LATE ONSET ALZHEIMER'S DISEASE WITHOUT BEHAVIORAL DISTURBANCE (HCC): ICD-10-CM

## 2024-12-10 DIAGNOSIS — F02.80 LATE ONSET ALZHEIMER'S DISEASE WITHOUT BEHAVIORAL DISTURBANCE (HCC): ICD-10-CM

## 2024-12-10 DIAGNOSIS — R26.2 AMBULATORY DYSFUNCTION: ICD-10-CM

## 2024-12-10 DIAGNOSIS — R32 DERMATITIS ASSOCIATED WITH INCONTINENCE: Primary | ICD-10-CM

## 2024-12-10 PROCEDURE — 99305 1ST NF CARE MODERATE MDM 35: CPT | Performed by: NURSE PRACTITIONER

## 2024-12-10 NOTE — LETTER
Patient:  Lucrecia Morales   11/22/1932           NITHIN Choi saw Lucrecia Morales for a wound care visit on 12/10/2024. See below for information relating to this visit.      Chief Complaint   Patient presents with    New Patient Visit        Assessment/Plan:  1. Dermatitis associated with incontinence  Assessment & Plan:  Buttocks  Patient is incontinent of bladder, use of diaper is not a contributing factor  Wound is 7 x 4 cm, partial-thickness, with no obvious sign of infection  Local wound care with Triad paste  Continue to offload, on air mattress  Increase protein intake, patient currently on Ensure twice a day, cranberry juice twice a day, under the care of registered dietitian  Follow-up next week  2. Ambulatory dysfunction  3. Late onset Alzheimer's disease without behavioral disturbance (HCC)  Assessment & Plan:  Under the care of Senior care team  Continue supportive care         Orders:  Lucrecia Morales  11/22/1932  Wound: Buttocks  Discontinue previous wound order  Cleanse the wound bed with NSS   Apply non-sting skin prep to periwound area  Apply Triad paste to wound bed  Frequency : Twice a day and prn for soiling  Offload all wounds  Turn and reposition frequently  Instruct / Assist with weight shifting in wheelchair  Increase protein intake.  Monitor for any sign of infection or worsening, inform PCP or patient's primary physician in your facility.      Follow Up:  Return in about 1 week (around 12/17/2024).       [facility] hours are 8:00 am - 4:30 pm Monday through Friday. The center phone number is [phone]. You can also contact me directly thru my email at [email or web address] or thru [email or web address]. If it is an emergency, please contact the PCP or patient's attending physician in your facility.     Sincerely,    Electronically signed by NITHIN Choi    Patient : Lucrecia Morales    11/22/1932

## 2024-12-11 NOTE — PROGRESS NOTES
[facility] WOUND CARE MANAGEMENT   AND HYPERBARIC MEDICINE CENTER       Patient ID: Lucrecia Morales is a 89 y.o. female Date of Birth 11/22/1932     Location of Service: [facility]    Performed wound round with: Wound team     Chief Complaint : Buttocks    Wound Instructions:  Wound: Buttocks  Discontinue previous wound order  Cleanse the wound bed with NSS   Apply non-sting skin prep to periwound area  Apply Triad paste to wound bed  Frequency : Twice a day and prn for soiling  Offload all wounds  Turn and reposition frequently  Instruct / Assist with weight shifting in wheelchair  Increase protein intake.  Monitor for any sign of infection or worsening, inform PCP or patient's primary physician in your facility.      Allergies  Patient has no known allergies.      Assessment & Plan:  1. Dermatitis associated with incontinence  Assessment & Plan:  Buttocks  Patient is incontinent of bladder, use of diaper is not a contributing factor  Wound is 7 x 4 cm, partial-thickness, with no obvious sign of infection  Local wound care with Triad paste  Continue to offload, on air mattress  Increase protein intake, patient currently on Ensure twice a day, cranberry juice twice a day, under the care of registered dietitian  Follow-up next week  2. Ambulatory dysfunction  3. Late onset Alzheimer's disease without behavioral disturbance (HCC)  Assessment & Plan:  Under the care of Senior care team  Continue supportive care           Subjective:   December 10, 2024.  New consult for wound on the buttocks.  Patient was referred by Senior care team.  Medical problem includes Alzheimer and bruxism.  Patient was seen with the facility wound team.    Wound history: Patient have history of skin discoloration on the buttocks.  As per report, wound was recently discovered.    Received patient in bed, seems comfortable.  Patient is a poor historian and was not able to provide information related to the  wound.  Currently on pressure ulcer prevention protocol in the facility.  Patient have an air mattress.        Review of Systems   Unable to perform ROS: Dementia       Objective:    Physical Exam  Constitutional:       Comments: Nonverbal, no acute distress   Cardiovascular:      Rate and Rhythm: Normal rate.   Pulmonary:      Effort: Pulmonary effort is normal.   Genitourinary:     Comments: Incontinent  Musculoskeletal:      Comments: Total dependent with positioning in bed   Skin:     Findings: Lesion present.      Comments: Buttocks: Wound size is 7 x 4 x 0.1 cm.,  100% epithelial, no drainage, periwound positive skin discoloration, with no obvious sign of infection   Neurological:      Mental Status: She is alert.              Procedures           Patient's care was coordinated with nursing facility staff. Recent vitals, labs and updated medications were reviewed on EMR or chart system of facility. Past Medical, surgical, social, medication and allergy history and patient's previous records were reviewed and updated as appropriate: Most up-to date information is available in the facility EMR where the patient is currently admitted.    Patient Active Problem List   Diagnosis    Debility    Late onset Alzheimer's disease without behavioral disturbance (HCC)    Hypothyroidism    Chronic deep vein thrombosis (DVT) (HCC)    Dysphagia    Muscle spasm    Bowel and bladder incontinence    Other constipation    Bruxism (teeth grinding)    Dermatitis associated with incontinence    Ambulatory dysfunction     Past Medical History:   Diagnosis Date    Alzheimer's dementia (HCC)     Anemia     Aphasia     Chronic diastolic CHF (congestive heart failure) (HCC)     Depression     DVT (deep venous thrombosis) (HCC)     Hyperlipidemia     Hypothyroidism     OA (osteoarthritis)     Osteoporosis      Past Surgical History:   Procedure Laterality Date     SECTION       Social History     Socioeconomic History    Marital  status:      Spouse name: None    Number of children: None    Years of education: None    Highest education level: None   Occupational History    Occupation: retired   Tobacco Use    Smoking status: Never    Smokeless tobacco: Never   Substance and Sexual Activity    Alcohol use: Not Currently    Drug use: None    Sexual activity: None   Other Topics Concern    None   Social History Narrative    None     Social Drivers of Health     Financial Resource Strain: Not on file   Food Insecurity: Not on file   Transportation Needs: Not on file   Physical Activity: Not on file   Stress: Not on file   Social Connections: Not on file   Intimate Partner Violence: Not on file   Housing Stability: Not on file        Current Outpatient Medications:     acetaminophen (TYLENOL) 160 mg/5 mL liquid, Take 20.3 mL by mouth 3 (three) times a day, Disp: , Rfl:     baclofen 10 mg tablet, Take 10 mg by mouth 2 (two) times a day, Disp: , Rfl:     bisacodyl (Dulcolax) 10 mg suppository, Insert 10 mg into the rectum every third day as needed for constipation, Disp: , Rfl:     gabapentin (NEURONTIN) 100 mg capsule, Take 100 mg by mouth 2 (two) times a day, Disp: , Rfl:     levothyroxine 50 mcg tablet, Take 50 mcg by mouth daily, Disp: , Rfl:     polyethylene glycol (MIRALAX) 17 g packet, Take 17 g by mouth daily as needed (constipation), Disp: , Rfl:     rivaroxaban (Xarelto) 15 mg tablet, Take 15 mg by mouth daily with breakfast, Disp: , Rfl:     senna-docusate sodium (SENOKOT S) 8.6-50 mg per tablet, Take 2 tablets by mouth daily, Disp: , Rfl:   History reviewed. No pertinent family history.           Coordination of Care: Wound team aware of the treatment plan. Facility nurse will provide wound treatment and monitor the wound for any changes.     Patient / Staff education : Patient / Staff was given education on sign of infection and pressure ulcer prevention. Patient/ Staff verbalized understanding     Follow up :  Next  "week    Voice-recognition software may have been used in the preparation of this document. Occasional wrong word or \"sound-alike\" substitutions may have occurred due to the inherent limitations of voice recognition software. Interpretation should be guided by context.      NITHIN Choi  "

## 2024-12-11 NOTE — ASSESSMENT & PLAN NOTE
Buttocks  Patient is incontinent of bladder, use of diaper is not a contributing factor  Wound is 7 x 4 cm, partial-thickness, with no obvious sign of infection  Local wound care with Triad paste  Continue to offload, on air mattress  Increase protein intake, patient currently on Ensure twice a day, cranberry juice twice a day, under the care of registered dietitian  Follow-up next week

## 2024-12-13 ENCOUNTER — NURSING HOME VISIT (OUTPATIENT)
Dept: GERIATRICS | Facility: OTHER | Age: 89
End: 2024-12-13
Payer: MEDICARE

## 2024-12-13 DIAGNOSIS — M62.838 MUSCLE SPASM: ICD-10-CM

## 2024-12-13 DIAGNOSIS — R13.19 OTHER DYSPHAGIA: ICD-10-CM

## 2024-12-13 DIAGNOSIS — F45.8 BRUXISM (TEETH GRINDING): ICD-10-CM

## 2024-12-13 DIAGNOSIS — E03.8 OTHER SPECIFIED HYPOTHYROIDISM: ICD-10-CM

## 2024-12-13 DIAGNOSIS — F02.80 LATE ONSET ALZHEIMER'S DISEASE WITHOUT BEHAVIORAL DISTURBANCE (HCC): Primary | ICD-10-CM

## 2024-12-13 DIAGNOSIS — I82.501 CHRONIC DEEP VEIN THROMBOSIS (DVT) OF RIGHT LOWER EXTREMITY, UNSPECIFIED VEIN (HCC): ICD-10-CM

## 2024-12-13 DIAGNOSIS — K59.09 OTHER CONSTIPATION: ICD-10-CM

## 2024-12-13 DIAGNOSIS — G30.1 LATE ONSET ALZHEIMER'S DISEASE WITHOUT BEHAVIORAL DISTURBANCE (HCC): Primary | ICD-10-CM

## 2024-12-13 PROCEDURE — 99309 SBSQ NF CARE MODERATE MDM 30: CPT | Performed by: FAMILY MEDICINE

## 2024-12-17 ENCOUNTER — NURSING HOME VISIT (OUTPATIENT)
Dept: WOUND CARE | Facility: HOSPITAL | Age: 89
End: 2024-12-17
Payer: MEDICARE

## 2024-12-17 DIAGNOSIS — F02.80 LATE ONSET ALZHEIMER'S DISEASE WITHOUT BEHAVIORAL DISTURBANCE (HCC): ICD-10-CM

## 2024-12-17 DIAGNOSIS — R32 DERMATITIS ASSOCIATED WITH INCONTINENCE: ICD-10-CM

## 2024-12-17 DIAGNOSIS — L25.8 DERMATITIS ASSOCIATED WITH INCONTINENCE: ICD-10-CM

## 2024-12-17 DIAGNOSIS — G30.1 LATE ONSET ALZHEIMER'S DISEASE WITHOUT BEHAVIORAL DISTURBANCE (HCC): ICD-10-CM

## 2024-12-17 DIAGNOSIS — R26.2 AMBULATORY DYSFUNCTION: Primary | ICD-10-CM

## 2024-12-17 PROCEDURE — 99307 SBSQ NF CARE SF MDM 10: CPT | Performed by: NURSE PRACTITIONER

## 2024-12-17 NOTE — LETTER
Patient:  Lucrecia Morales   11/22/1932           NITHIN Choi saw Lucrecia Morales for a wound care visit on 12/17/2024. See below for information relating to this visit.      Chief Complaint   Patient presents with    Follow Up Wound Care Visit        Assessment/Plan:  1. Ambulatory dysfunction  2. Late onset Alzheimer's disease without behavioral disturbance (HCC)  Assessment & Plan:  Under the care of Senior care team  Continue supportive care  3. Dermatitis associated with incontinence  Assessment & Plan:  Location : Buttocks  Wound is healed  Dynashield for moisture management  Continue to ollfoad  Sign off. Facility staff will continue to provide treatment and monitor the wound. Can reconsult wound nurse practitioner if wound failed to heal or worsened.            Orders:  Lucrecia Calderóneunice  11/22/1932  Wound: Buttocks  Discontinue previous wound order  Cleanse the skin/wound bed with soap and water, pat dry  Apply Yolanda shield to wound bed/skin  Frequency : 3 times a day and prn for soiling  Offload all wounds  Turn and reposition frequently,   Instruct / Assist with weight shifting in chair  Increase protein intake.  Monitor for any sign of infection or worsening, inform PCP or patient's primary physician in your facility.         Follow Up:  No follow-ups on file.       [facility] hours are 8:00 am - 4:30 pm Monday through Friday. The center phone number is [phone]. You can also contact me directly thru my email at [email or web address] or thru tiger text. If it is an emergency, please contact the PCP or patient's attending physician in your facility.     Sincerely,    Electronically signed by NITHIN Choi    Patient : Lucrecia Morales    11/22/1932

## 2024-12-18 NOTE — PROGRESS NOTES
[facility] WOUND CARE MANAGEMENT   AND HYPERBARIC MEDICINE CENTER       Patient ID: Lucrecia Morales is a 89 y.o. female Date of Birth 11/22/1932     Location of Service: [facility]    Performed wound round with: Wound team     Chief Complaint : Buttocks    Wound Instructions:  Wound: Buttocks  Discontinue previous wound order  Cleanse the skin/wound bed with soap and water, pat dry  Apply Yolanda shield to wound bed/skin  Frequency : 3 times a day and prn for soiling  Offload all wounds  Turn and reposition frequently,   Instruct / Assist with weight shifting in chair  Increase protein intake.  Monitor for any sign of infection or worsening, inform PCP or patient's primary physician in your facility.    Allergies  Patient has no known allergies.      Assessment & Plan:  1. Ambulatory dysfunction  2. Late onset Alzheimer's disease without behavioral disturbance (HCC)  Assessment & Plan:  Under the care of Senior care team  Continue supportive care  3. Dermatitis associated with incontinence  Assessment & Plan:  Location : Buttocks  Wound is healed  Dynashield for moisture management  Continue to ollfoad  Sign off. Facility staff will continue to provide treatment and monitor the wound. Can reconsult wound nurse practitioner if wound failed to heal or worsened.                Subjective:   December 10, 2024.  New consult for wound on the buttocks.  Patient was referred by Senior care team.  Medical problem includes Alzheimer and bruxism.  Patient was seen with the facility wound team.    Wound history: Patient have history of skin discoloration on the buttocks.  As per report, wound was recently discovered.    Received patient in bed, seems comfortable.  Patient is a poor historian and was not able to provide information related to the wound.  Currently on pressure ulcer prevention protocol in the facility.  Patient have an air mattress.  December 17, 2024.  Follow-up for wound on  the    2024.  Follow-up for wound on the buttocks.  Received patient in bed, seems comfortable.  Denies pain.        Review of Systems   Unable to perform ROS: Dementia       Objective:    Physical Exam  Constitutional:       Comments: Nonverbal, no acute distress   Cardiovascular:      Rate and Rhythm: Normal rate.   Pulmonary:      Effort: Pulmonary effort is normal.   Genitourinary:     Comments: Incontinent  Musculoskeletal:      Comments: Total dependent with positioning in bed   Skin:     Findings: No lesion.      Comments: Buttocks: Wound is healed   Neurological:      Mental Status: She is alert.              Procedures           Patient's care was coordinated with nursing facility staff. Recent vitals, labs and updated medications were reviewed on EMR or chart system of facility. Past Medical, surgical, social, medication and allergy history and patient's previous records were reviewed and updated as appropriate: Most up-to date information is available in the facility EMR where the patient is currently admitted.    Patient Active Problem List   Diagnosis    Debility    Late onset Alzheimer's disease without behavioral disturbance (HCC)    Hypothyroidism    Chronic deep vein thrombosis (DVT) (HCC)    Dysphagia    Muscle spasm    Bowel and bladder incontinence    Other constipation    Bruxism (teeth grinding)    Dermatitis associated with incontinence    Ambulatory dysfunction     Past Medical History:   Diagnosis Date    Alzheimer's dementia (HCC)     Anemia     Aphasia     Chronic diastolic CHF (congestive heart failure) (HCC)     Depression     DVT (deep venous thrombosis) (HCC)     Hyperlipidemia     Hypothyroidism     OA (osteoarthritis)     Osteoporosis      Past Surgical History:   Procedure Laterality Date     SECTION       Social History     Socioeconomic History    Marital status:      Spouse name: None    Number of children: None    Years of education: None    Highest  education level: None   Occupational History    Occupation: retired   Tobacco Use    Smoking status: Never    Smokeless tobacco: Never   Substance and Sexual Activity    Alcohol use: Not Currently    Drug use: None    Sexual activity: None   Other Topics Concern    None   Social History Narrative    None     Social Drivers of Health     Financial Resource Strain: Not on file   Food Insecurity: Not on file   Transportation Needs: Not on file   Physical Activity: Not on file   Stress: Not on file   Social Connections: Not on file   Intimate Partner Violence: Not on file   Housing Stability: Not on file        Current Outpatient Medications:     acetaminophen (TYLENOL) 160 mg/5 mL liquid, Take 20.3 mL by mouth 3 (three) times a day, Disp: , Rfl:     baclofen 10 mg tablet, Take 10 mg by mouth 2 (two) times a day, Disp: , Rfl:     bisacodyl (Dulcolax) 10 mg suppository, Insert 10 mg into the rectum every third day as needed for constipation, Disp: , Rfl:     gabapentin (NEURONTIN) 100 mg capsule, Take 100 mg by mouth 2 (two) times a day, Disp: , Rfl:     levothyroxine 50 mcg tablet, Take 50 mcg by mouth daily, Disp: , Rfl:     polyethylene glycol (MIRALAX) 17 g packet, Take 17 g by mouth daily as needed (constipation), Disp: , Rfl:     rivaroxaban (Xarelto) 15 mg tablet, Take 15 mg by mouth daily with breakfast, Disp: , Rfl:     senna-docusate sodium (SENOKOT S) 8.6-50 mg per tablet, Take 2 tablets by mouth daily, Disp: , Rfl:   History reviewed. No pertinent family history.           Coordination of Care: Wound team aware of the treatment plan. Facility nurse will provide wound treatment and monitor the wound for any changes.     Patient / Staff education : Patient / Staff was given education on sign of infection and pressure ulcer prevention. Patient/ Staff verbalized understanding     Follow up :  Sign off    Voice-recognition software may have been used in the preparation of this document. Occasional wrong word or  "\"sound-alike\" substitutions may have occurred due to the inherent limitations of voice recognition software. Interpretation should be guided by context.      NITHIN Choi  "

## 2024-12-19 NOTE — ASSESSMENT & PLAN NOTE
Location : Buttocks  Wound is healed  Dynashield for moisture management  Continue to ollfoad  Sign off. Facility staff will continue to provide treatment and monitor the wound. Can reconsult wound nurse practitioner if wound failed to heal or worsened.

## 2024-12-20 NOTE — PROGRESS NOTES
[facility]  [address] [address]  [phone]  Facility:  [facility]  POS 32    NAME: Lucrecia Morales  AGE: 89 y.o. SEX: female [phone]  DATE OF ENCOUNTER: 12/13/2024    Assessment and Plan   Diagnoses and all orders for this visit:    Late onset Alzheimer's disease without behavioral disturbance (HCC)    Chronic deep vein thrombosis (DVT) of right lower extremity, unspecified vein (HCC)    Bruxism (teeth grinding)    Other dysphagia    Other specified hypothyroidism    Muscle spasm    Other constipation    Late onset Alzheimer's disease without behavioral disturbance (HCC)  With progressive decline and requires max to complete assist with all ADLs  Transfers with Amisha lift. Functional assessment staging 7  Continue 24/7 care and support at long-term care facility for ADLs; IADLs  Continue delirium precautions     Chronic deep vein thrombosis (DVT) (HCC)  Continue Xarelto for anticoagulation.     Bruxism (teeth grinding)  Continues with bruxism  Continue scheduled liquid Tylenol, baclofen, and gabapentin     Dysphagia  Continue dysphagia 1 - puree diet, thin liquid  Staff and family assist with feeding  Family provides baby food if patient does not eat her meals at facility     Hypothyroidism  Continue levothyroxine 50 mcg daily  No further blood work as per daughter's wishes     Muscle spasm  Maintained on baclofen 10 mg twice daily     Other constipation  Continue senna S p.o. daily  Continue Dulcolax suppository and MiraLax p.r.n.    Chief Complaint   Routine Long term follow-up visit.  History of Present Illness   The patient is seen in the room during routine LTC follow up visit. She is sitting in her chair. No eye contact or verbal response. Family is very supportive, visiting her daily. No concerns from nursing report.    The following portions of the patient's history were reviewed and updated as appropriate: allergies, current medications, past family history, past  medical history, past social history, past surgical history and problem list.    Review of Systems     Review of Systems   Unable to perform ROS: Dementia     Active Problem List     Patient Active Problem List   Diagnosis    Debility    Late onset Alzheimer's disease without behavioral disturbance (HCC)    Hypothyroidism    Chronic deep vein thrombosis (DVT) (HCC)    Dysphagia    Muscle spasm    Bowel and bladder incontinence    Other constipation    Bruxism (teeth grinding)    Dermatitis associated with incontinence    Ambulatory dysfunction       Objective     Vitals: EHR at facility reviewed, stable.  Nursing note reviewed.   General: no acute distress.  HENT:      Head: Normocephalic.      Nose: Nose normal.      Mouth: Mucous membranes are moist.   Eyes:       Right eye: No discharge.         Left eye: No discharge.      Conjunctivae normal.   Cardiovascular:      Normal rate and regular rhythm. No murmur heard.  Pulmonary:      Pulmonary effort is normal. No wheezing, rhonchi or rales.   Abdominal:      Bowel sounds are normal. There is no distension.      Abdomen is soft. There is no abdominal tenderness.   Musculoskeletal:      Right lower leg: No edema.      Left lower leg: No edema.   Skin:     General: Skin is warm.   Neurological: alert. Eyes open. Does not answer questions or follow commands.      Pertinent Laboratory/Diagnostic Studies:  Refer to facility chart.    Current Medications   Medications reviewed and updated in facility chart.

## 2025-02-11 ENCOUNTER — NURSING HOME VISIT (OUTPATIENT)
Dept: GERIATRICS | Facility: OTHER | Age: OVER 89
End: 2025-02-11
Payer: MEDICARE

## 2025-02-11 DIAGNOSIS — F02.80 LATE ONSET ALZHEIMER'S DISEASE WITHOUT BEHAVIORAL DISTURBANCE (HCC): Primary | ICD-10-CM

## 2025-02-11 DIAGNOSIS — G30.1 LATE ONSET ALZHEIMER'S DISEASE WITHOUT BEHAVIORAL DISTURBANCE (HCC): Primary | ICD-10-CM

## 2025-02-11 DIAGNOSIS — I82.501 CHRONIC DEEP VEIN THROMBOSIS (DVT) OF RIGHT LOWER EXTREMITY, UNSPECIFIED VEIN (HCC): ICD-10-CM

## 2025-02-11 DIAGNOSIS — R53.81 DEBILITY: ICD-10-CM

## 2025-02-11 DIAGNOSIS — E03.8 OTHER SPECIFIED HYPOTHYROIDISM: ICD-10-CM

## 2025-02-11 DIAGNOSIS — F45.8 BRUXISM (TEETH GRINDING): ICD-10-CM

## 2025-02-11 DIAGNOSIS — R13.19 OTHER DYSPHAGIA: ICD-10-CM

## 2025-02-11 PROCEDURE — 99309 SBSQ NF CARE MODERATE MDM 30: CPT | Performed by: FAMILY MEDICINE

## 2025-02-11 NOTE — PROGRESS NOTES
[facility]  [address] [address]  [phone]  Facility: [place] [address]    NAME: Lucrecia Morales  AGE: 89 y.o. SEX: female [phone]  DATE OF ENCOUNTER: 2/11/2025    Assessment and Plan   Diagnoses and all orders for this visit:    Late onset Alzheimer's disease without behavioral disturbance (HCC)    Chronic deep vein thrombosis (DVT) of right lower extremity, unspecified vein (HCC)    Bruxism (teeth grinding)    Other dysphagia    Other specified hypothyroidism    Debility    Late onset Alzheimer's disease without behavioral disturbance (HCC)  With progressive decline and requires max to complete assist with all ADLs  Transfers with [facility]. Functional assessment staging 7  Continue 24/7 care and support at long-term care facility for ADLs; IADLs     Chronic deep vein thrombosis (DVT) (HCC)  Continue Xarelto for anticoagulation.     Bruxism (teeth grinding)  Continues with bruxism  Continue scheduled liquid Tylenol, baclofen, and gabapentin     Dysphagia  Continue dysphagia 1 - puree diet, thin liquid  Staff and family assist with feeding  Family provides baby food if patient does not eat her meals at facility     Hypothyroidism  Continue levothyroxine 50 mcg daily  No further blood work as per daughter's wishes.    Chief Complaint   Routine Long term follow-up visit.  History of Present Illness   The patient is seen in the room during routine LTC follow up visit. She is lying in recliner. She is on puree diet with thin liquid. Takes Ensure Plus TID and cranberry juice. Hospice option was discussed given significant weight loss. However, POA doesn't pursue hospice care.    The following portions of the patient's history were reviewed and updated as appropriate: allergies, current medications, past family history, past medical history, past social history, past surgical history and problem list.    Review of Systems     Review of Systems   Unable to perform ROS: Dementia        Active Problem List     Patient Active Problem List   Diagnosis    Debility    Late onset Alzheimer's disease without behavioral disturbance (HCC)    Hypothyroidism    Chronic deep vein thrombosis (DVT) (HCC)    Dysphagia    Muscle spasm    Bowel and bladder incontinence    Other constipation    Bruxism (teeth grinding)    Dermatitis associated with incontinence    Ambulatory dysfunction       Objective     Vitals: EHR at facility reviewed, stable.  Nursing note reviewed.   General: no acute distress.  HENT:      Head: Normocephalic.      Nose: Nose normal.      Mouth: Mucous membranes are moist.   Eyes:       Right eye: No discharge.         Left eye: No discharge.      Conjunctivae normal.   Cardiovascular:      Normal rate and regular rhythm. No murmur heard.  Pulmonary:      Pulmonary effort is normal. No wheezing, rhonchi or rales.   Abdominal:      Bowel sounds are normal. There is no distension.      Abdomen is soft. There is no abdominal tenderness.   Musculoskeletal:      Right lower leg: No edema.      Left lower leg: No edema.   Skin:     General: Skin is warm.   Neurological: alert.        Current Medications   Medications reviewed and updated in facility chart.

## 2025-03-05 ENCOUNTER — NURSING HOME VISIT (OUTPATIENT)
Dept: GERIATRICS | Facility: OTHER | Age: OVER 89
End: 2025-03-05
Payer: MEDICARE

## 2025-03-05 ENCOUNTER — TELEPHONE (OUTPATIENT)
Dept: OTHER | Facility: OTHER | Age: OVER 89
End: 2025-03-05

## 2025-03-05 DIAGNOSIS — G30.1 LATE ONSET ALZHEIMER'S DISEASE WITHOUT BEHAVIORAL DISTURBANCE (HCC): ICD-10-CM

## 2025-03-05 DIAGNOSIS — Z51.5 COMFORT MEASURES ONLY STATUS: ICD-10-CM

## 2025-03-05 DIAGNOSIS — F02.80 LATE ONSET ALZHEIMER'S DISEASE WITHOUT BEHAVIORAL DISTURBANCE (HCC): ICD-10-CM

## 2025-03-05 DIAGNOSIS — R06.81 APNEA: Primary | ICD-10-CM

## 2025-03-05 PROCEDURE — 99310 SBSQ NF CARE HIGH MDM 45: CPT | Performed by: NURSE PRACTITIONER

## 2025-03-05 RX ORDER — MORPHINE SULFATE 100 MG/5ML
5 SOLUTION ORAL EVERY 2 HOUR PRN
COMMUNITY

## 2025-03-05 NOTE — ASSESSMENT & PLAN NOTE
Generalized overall decline in condition and family opted for comfort care measures only.  Patient to remain at [facility] for care.  Family at bedside this morning  Will provide supportive treatment/comfort meds as needed

## 2025-03-05 NOTE — TELEPHONE ENCOUNTER
Darrick with [facility] requested to speak to the on call provider. Patient is having difficulty breathing.    Contacted the on call provider via ESC.

## 2025-03-05 NOTE — ASSESSMENT & PLAN NOTE
Notified by nursing staff of change in respiratory status around 4 AM with periods of apnea.  Family opted for comfort care measures.  Morphine sulfate liquid ordered every 2 hours PRN for shortness of breath or pain  Will continue to monitor respiratory status

## 2025-03-05 NOTE — PROGRESS NOTES
Facility: [facility]  POS: 32  Acute Med Note  Code status: DNR/Comfort care measures     Assessment/Plan:  89-year-old female with:     Apnea  Notified by nursing staff of change in respiratory status around 4 AM with periods of apnea.  Family opted for comfort care measures.  Morphine sulfate liquid ordered every 2 hours PRN for shortness of breath or pain  Will continue to monitor respiratory status    Comfort measures only status  Generalized overall decline in condition and family opted for comfort care measures only.  Patient to remain at [facility] for care.  Family at bedside this morning  NPO status except for morphine PRN   Will provide supportive treatment/comfort meds as needed    Late onset Alzheimer's disease without behavioral disturbance (HCC)  Patient resides at [facility]   With progressive decline.  See above plan    Subjective: Non-verbal      Patient ID: Lucrecia Morales is a 89 y.o. female.    Received a call from nursing staff around 4:30 AM this morning to report overall decline in patient's condition.  Patient having periods of apnea.  Advised nurse to call her daughter to discuss goals of care.  Patient's daughter opted for comfort care measures only and morphine sulfate PRN was ordered for shortness of breath or pain.  Received patient in bed with family at bedside.  Patient has her eyes open and nonverbal.  Family states that she is having periods of apnea but does not appear to be in distress.  Informed her family that I am available in house today if patient should not need any more supportive care.     The following portions of the patient's history were reviewed and updated as appropriate:  Allergies, current medications, Past Family history, past medical history, past social history, past surgical history, and problem list.    Review of Systems   Unable to perform ROS: Patient nonverbal         Objective:    There were no vitals taken for this visit.      Physical Exam  Vitals and nursing note reviewed.   Constitutional:       General: She is not in acute distress.     Appearance: She is ill-appearing. She is not toxic-appearing or diaphoretic.   HENT:      Head: Normocephalic.      Nose: Congestion present.      Mouth/Throat:      Mouth: Mucous membranes are dry.      Comments: Dry mucous in mouth  Eyes:      Comments: Eyes were open   Pulmonary:      Breath sounds: Rhonchi present.      Comments: Respirations labored with periods of apnea.   Musculoskeletal:      Comments: Moves all 4 extremities.   Skin:     General: Skin is warm and dry.   Neurological:      Mental Status: She is alert.      Comments: Nonverbal         I have spent a total time of 45 minutes in caring for this patient on the day of the visit/encounter including Counseling / Coordination of care, Documenting in the medical record, Reviewing/placing orders in the medical record (including tests, medications, and/or procedures), Obtaining or reviewing history  , and Communicating with other healthcare professionals .     This note was completed in part utilizing with Dragon medical one voice recognition software.  Grammatical errors, random word insertion, spelling mistakes, and incomplete sentences may be an occasional consequence of the system secondary to software limitations, ambient noise and hardware issues.  At the time of dictation, efforts were made to edit, clarify and/or correct errors.  Please read the chart carefully and recognize, using context, where substitutions have occurred.  If you have any questions or concerns about the context, text or information contained within the body of this dictation, please contact myself, the provider, for further clarification.

## 2025-03-07 ENCOUNTER — TELEPHONE (OUTPATIENT)
Dept: OTHER | Facility: OTHER | Age: OVER 89
End: 2025-03-07

## 2025-03-07 NOTE — TELEPHONE ENCOUNTER
Nursing home or Independent living name:  [facility]    Who is calling:  Eleonora    Callback number:   [phone]     Symptoms:    @ 0423    Message sent to on call provider; verified read receipt of message.

## 2025-03-07 NOTE — TELEPHONE ENCOUNTER
Pt is actively passing and is on morphine, given an hour ago but it is not helping with her secretion. Would like to discuss other medication options     On call provider paged